# Patient Record
Sex: FEMALE | Race: WHITE | NOT HISPANIC OR LATINO | Employment: OTHER | URBAN - METROPOLITAN AREA
[De-identification: names, ages, dates, MRNs, and addresses within clinical notes are randomized per-mention and may not be internally consistent; named-entity substitution may affect disease eponyms.]

---

## 2017-01-25 ENCOUNTER — OFFICE VISIT (OUTPATIENT)
Dept: ORTHOPEDICS | Facility: CLINIC | Age: 67
End: 2017-01-25
Payer: MEDICARE

## 2017-01-25 VITALS
BODY MASS INDEX: 26.96 KG/M2 | WEIGHT: 157.94 LBS | HEART RATE: 82 BPM | SYSTOLIC BLOOD PRESSURE: 110 MMHG | HEIGHT: 64 IN | DIASTOLIC BLOOD PRESSURE: 73 MMHG

## 2017-01-25 DIAGNOSIS — M17.11 PRIMARY OSTEOARTHRITIS OF RIGHT KNEE: ICD-10-CM

## 2017-01-25 DIAGNOSIS — Z96.642 STATUS POST TOTAL HIP REPLACEMENT, LEFT: Primary | ICD-10-CM

## 2017-01-25 PROCEDURE — 99213 OFFICE O/P EST LOW 20 MIN: CPT | Mod: PBBFAC | Performed by: ORTHOPAEDIC SURGERY

## 2017-01-25 PROCEDURE — 99999 PR PBB SHADOW E&M-EST. PATIENT-LVL III: CPT | Mod: PBBFAC,,, | Performed by: ORTHOPAEDIC SURGERY

## 2017-01-25 PROCEDURE — 99212 OFFICE O/P EST SF 10 MIN: CPT | Mod: S$PBB,,, | Performed by: ORTHOPAEDIC SURGERY

## 2017-01-25 RX ORDER — NITROFURANTOIN 25; 75 MG/1; MG/1
CAPSULE ORAL
Refills: 0 | COMMUNITY
Start: 2016-12-09 | End: 2017-03-10 | Stop reason: ALTCHOICE

## 2017-01-25 NOTE — MR AVS SNAPSHOT
[facility] - Orthopedics  [address]  [facility] [address]  Phone: [phone]                  Pamella Yuan   2017 11:30 AM   Office Visit    Description:  Female : 1950   Provider:  Hubert Swain MD   Department:  [facility]           Reason for Visit     Follow-up           Diagnoses this Visit        Comments    Status post total hip replacement, left    -  Primary     Primary osteoarthritis of right knee                To Do List           Goals (5 Years of Data)     None      Follow-Up and Disposition     Return in about 1 year (around 2018).    Follow-up and Disposition History      [facility]     [facility] -  Assistance  Registered nurses in the [facility] provide clinical advisement, health education, appointment booking, and other advisory services.  Call for this free service at [phone].             Medications           Message regarding Medications     Verify the changes and/or additions to your medication regime listed below are the same as discussed with your clinician today.  If any of these changes or additions are incorrect, please notify your healthcare provider.             Verify that the below list of medications is an accurate representation of the medications you are currently taking.  If none reported, the list may be blank. If incorrect, please contact your healthcare provider. Carry this list with you in case of emergency.           Current Medications     b complex vitamins tablet Take 1 tablet by mouth once daily.    krill-om3-dha-epa-om6-lip-astx (KRILL OIL, OMEGA 3 & 6,) 1,500-165-67.5 mg Cap Take 1 capsule by mouth once daily.    meloxicam (MOBIC) 15 MG tablet Take 1 tablet (15 mg total) by mouth once daily.    UNABLE TO FIND 1 tablet every morning. Estroven and energy    VIT C/E/ZN/COPPR/LUTEIN/ZEAXAN (PRESERVISION AREDS 2 ORAL) Take 2 tablets by mouth every morning.     "nitrofurantoin, macrocrystal-monohydrate, (MACROBID) 100 MG capsule TAKE ONE CAPSULE BY MOUTH TWICE DAILY FOR 5 DAYS           Clinical Reference Information           Vital Signs - Last Recorded  Most recent update: 1/25/2017 11:08 AM by Mago Almaraz MA    BP Pulse Ht Wt BMI    110/73 (BP Location: Right arm, Patient Position: Sitting, BP Method: Automatic) 82 5' 4" (1.626 m) 71.6 kg (157 lb 15.4 oz) 27.11 kg/m2      Blood Pressure          Most Recent Value    BP  110/73      Allergies as of 1/25/2017     Decongestant (Ppa)    Gluten Protein      Immunizations Administered on Date of Encounter - 1/25/2017     None      "

## 2017-01-25 NOTE — PROGRESS NOTES
Pamella Yuna is in for one year follow up for a left HARRIET.  She is doing well.  No pain in the hip.  She has resumed activities of daily living.  Right knee injection last visit provided excellent relief.    Exam demonstrates  A well developed female in no distress.  Alert and oriented.  Mood and affect are appropriate.    Hip incision is well healed.  There is a good, stable range of motion and leg lengths are clinically equal.  The extremity is neurovascularly intact.    Xrays demonstrate a well fixed and positioned prosthesis.    Imp: Doing well      F/u in one year with left hip and bilateral knee xrays

## 2017-03-10 ENCOUNTER — OFFICE VISIT (OUTPATIENT)
Dept: INTERNAL MEDICINE | Facility: CLINIC | Age: 67
End: 2017-03-10
Payer: MEDICARE

## 2017-03-10 ENCOUNTER — LAB VISIT (OUTPATIENT)
Dept: LAB | Facility: HOSPITAL | Age: 67
End: 2017-03-10
Attending: INTERNAL MEDICINE
Payer: MEDICARE

## 2017-03-10 VITALS
DIASTOLIC BLOOD PRESSURE: 88 MMHG | WEIGHT: 166 LBS | HEART RATE: 68 BPM | SYSTOLIC BLOOD PRESSURE: 124 MMHG | HEIGHT: 64 IN | BODY MASS INDEX: 28.34 KG/M2

## 2017-03-10 DIAGNOSIS — R35.0 FREQUENCY OF URINATION: ICD-10-CM

## 2017-03-10 DIAGNOSIS — R53.83 FATIGUE, UNSPECIFIED TYPE: ICD-10-CM

## 2017-03-10 DIAGNOSIS — E78.5 HYPERLIPIDEMIA, UNSPECIFIED HYPERLIPIDEMIA TYPE: ICD-10-CM

## 2017-03-10 DIAGNOSIS — D64.9 ANEMIA, UNSPECIFIED TYPE: ICD-10-CM

## 2017-03-10 DIAGNOSIS — Z00.00 HEALTH CARE MAINTENANCE: ICD-10-CM

## 2017-03-10 DIAGNOSIS — R92.8 ABNORMAL MAMMOGRAM: Primary | ICD-10-CM

## 2017-03-10 DIAGNOSIS — R10.819 ABDOMINAL TENDERNESS IN LEFT FLANK: ICD-10-CM

## 2017-03-10 LAB
ALBUMIN SERPL BCP-MCNC: 3.6 G/DL
ALP SERPL-CCNC: 188 U/L
ALT SERPL W/O P-5'-P-CCNC: 27 U/L
ANION GAP SERPL CALC-SCNC: 8 MMOL/L
AST SERPL-CCNC: 27 U/L
BASOPHILS # BLD AUTO: 0.04 K/UL
BASOPHILS NFR BLD: 0.6 %
BILIRUB SERPL-MCNC: 0.5 MG/DL
BUN SERPL-MCNC: 11 MG/DL
CALCIUM SERPL-MCNC: 9.5 MG/DL
CHLORIDE SERPL-SCNC: 103 MMOL/L
CHOLEST/HDLC SERPL: 3.8 {RATIO}
CO2 SERPL-SCNC: 29 MMOL/L
CREAT SERPL-MCNC: 0.7 MG/DL
DIFFERENTIAL METHOD: NORMAL
EOSINOPHIL # BLD AUTO: 0.2 K/UL
EOSINOPHIL NFR BLD: 2.1 %
ERYTHROCYTE [DISTWIDTH] IN BLOOD BY AUTOMATED COUNT: 14.2 %
EST. GFR  (AFRICAN AMERICAN): >60 ML/MIN/1.73 M^2
EST. GFR  (NON AFRICAN AMERICAN): >60 ML/MIN/1.73 M^2
GLUCOSE SERPL-MCNC: 84 MG/DL
HCT VFR BLD AUTO: 43.3 %
HDL/CHOLESTEROL RATIO: 26.6 %
HDLC SERPL-MCNC: 271 MG/DL
HDLC SERPL-MCNC: 72 MG/DL
HGB BLD-MCNC: 14.3 G/DL
LDLC SERPL CALC-MCNC: 183.4 MG/DL
LYMPHOCYTES # BLD AUTO: 2.4 K/UL
LYMPHOCYTES NFR BLD: 34.5 %
MCH RBC QN AUTO: 29.7 PG
MCHC RBC AUTO-ENTMCNC: 33 %
MCV RBC AUTO: 90 FL
MONOCYTES # BLD AUTO: 0.5 K/UL
MONOCYTES NFR BLD: 7.4 %
NEUTROPHILS # BLD AUTO: 3.9 K/UL
NEUTROPHILS NFR BLD: 55.1 %
NONHDLC SERPL-MCNC: 199 MG/DL
PLATELET # BLD AUTO: 209 K/UL
PMV BLD AUTO: 10.9 FL
POTASSIUM SERPL-SCNC: 3.8 MMOL/L
PROT SERPL-MCNC: 7.6 G/DL
RBC # BLD AUTO: 4.81 M/UL
SODIUM SERPL-SCNC: 140 MMOL/L
TRIGL SERPL-MCNC: 78 MG/DL
TSH SERPL DL<=0.005 MIU/L-ACNC: 2.36 UIU/ML
WBC # BLD AUTO: 7.05 K/UL

## 2017-03-10 PROCEDURE — 36415 COLL VENOUS BLD VENIPUNCTURE: CPT | Mod: PO

## 2017-03-10 PROCEDURE — 85025 COMPLETE CBC W/AUTO DIFF WBC: CPT

## 2017-03-10 PROCEDURE — 84443 ASSAY THYROID STIM HORMONE: CPT

## 2017-03-10 PROCEDURE — 80053 COMPREHEN METABOLIC PANEL: CPT

## 2017-03-10 PROCEDURE — 99999 PR PBB SHADOW E&M-EST. PATIENT-LVL III: CPT | Mod: PBBFAC,,, | Performed by: INTERNAL MEDICINE

## 2017-03-10 PROCEDURE — 99214 OFFICE O/P EST MOD 30 MIN: CPT | Mod: S$PBB,,, | Performed by: INTERNAL MEDICINE

## 2017-03-10 PROCEDURE — 80061 LIPID PANEL: CPT

## 2017-03-10 NOTE — PROGRESS NOTES
HISTORY OF PRESENT ILLNESS:  PtDomenica is a 66 y.o. female presents for annual and also with possible bladder infection.  She was last seen 4/9/16.  Has been seeing Dr. Swain of [facility].  She also was supposed to have breast biopsy after abnormal mammogram, had stated she was getting a second opinion, was seen by Dr. Chaudhari in [place], is pending repeat in 4/17 for repeat diagnostic.  She had seen Dr. Abrams in 2003, was diagnosed with sprue.  She states she keeps to diet.    Lab Results   Component Value Date    WBC 8.95 02/19/2016    HGB 9.9 (L) 02/19/2016    HCT 29.7 (L) 02/19/2016     02/19/2016    CHOL 265 (H) 11/20/2015    TRIG 74 11/20/2015    HDL 80 (H) 11/20/2015    ALT 24 01/19/2016    AST 25 01/19/2016     02/19/2016    K 3.9 02/19/2016     (H) 02/19/2016    CREATININE 0.6 02/19/2016    BUN 9 02/19/2016    CO2 22 (L) 02/19/2016    TSH 3.034 11/20/2015    INR 0.9 02/02/2016     Lab Results   Component Value Date    LDLCALC 170.2 (H) 11/20/2015               ROS:  GENERAL: No fever, chills, positive fatigability; no weight loss.  SKIN: No rashes, itching or changes in color or texture of skin; positive skin lesion;  HEAD: No headaches or recent head trauma.  EARS: Denies ear pain, discharge or vertigo.  NOSE: No loss of smell, no epistaxis or postnasal drip.  MOUTH & THROAT: No hoarseness or change in voice. No excessive gum bleeding.  NODES: Denies swollen glands.  CHEST: Denies MARTINZE, cyanosis, wheezing, cough and sputum production.  CARDIOVASCULAR: Denies chest pain, PND, orthopnea or reduced exercise tolerance.  ABDOMEN: Appetite fine. No weight loss. Occ constipation, no diarrhea, abdominal pain, hematemesis or blood in stool.  URINARY: No flank pain, dysuria or hematuria; chronic intermittent frequency; has nagging to left flank;  PERIPHERAL VASCULAR: No claudication or cyanosis. No edema.  MUSCULOSKELETAL: No joint stiffness or swelling. Denies back pain.  NEUROLOGIC: Denies  numbness    PE:   Vitals:   Vitals:    03/10/17 1022   BP: 124/88   Pulse: 68     GENERAL: no acute distress, A&Ox3, comfortable.  Female with body mass index of 28   HEENT: tympanic membranes clear, nasal mucosa pink, no pharyngeal erythema or exudate  NECK: supple, no cervical lymphadenopathy, no thyromegaly; no supraclavicular nodes;   CHEST:  clear to auscultation bilaterally, no crackles or wheeze; no increased work of breathing;  CARDIOVASCULAR: regular rate and rhythm, no rubs, murmurs or gallops.  ABDOMEN: normal bowel sounds, soft non-tender, non-distended; no palpable organomegaly;   EXT: no clubbing, cyanosis or edema.     ASSESSMENT/PLAN:    Abnormal mammogram: being followed in [place];    Anemia, unspecified type  -     CBC auto differential; Future; Expected date: 3/10/17    Hyperlipidemia, unspecified hyperlipidemia type  -     Comprehensive metabolic panel; Future; Expected date: 3/10/17  -     Lipid panel; Future; Expected date: 3/10/17    Frequency of urination  -     URINALYSIS; Future; Expected date: 3/10/17  -     CULTURE, URINE; Future; Expected date: 3/10/17    Fatigue, unspecified type  -     CBC auto differential; Future; Expected date: 3/10/17  -     TSH; Future; Expected date: 3/10/17    Abdominal tenderness in left flank  -     US Abdomen Complete; Future; Expected date: 3/10/17    Health care maintenance  -     CBC auto differential; Future; Expected date: 3/10/17  -     Comprehensive metabolic panel; Future; Expected date: 3/10/17  -     Lipid panel; Future; Expected date: 3/10/17  -     Pneumococcal Conjugate Vaccine (13 Valent) (IM)        Call if condition changes or worsens.

## 2017-03-10 NOTE — MR AVS SNAPSHOT
[facility]  [address]  [facility] [address]  Phone: [phone]  Fax: [phone]                  Pamella Yuan   3/10/2017 10:20 AM   Office Visit    Description:  Female : 1950   Provider:  Pooja Vazquez MD   Department:  [facility]           Reason for Visit     possible bladder infection           Diagnoses this Visit        Comments    Health care maintenance    -  Primary     Anemia, unspecified type         Hyperlipidemia, unspecified hyperlipidemia type         Frequency of urination         Fatigue, unspecified type         Abdominal tenderness in left flank                To Do List           Future Appointments        Provider Department Dept Phone    3/10/2017 11:05 AM LAB, [place] [facility] [phone]    3/10/2017 11:20 AM LAB, [place] [facility] [phone]    3/16/2017 8:45 AM [facility] [phone]    2017 10:00 AM Lisa Elder MD [facility] [phone]      Goals (5 Years of Data)     None      [facility]     [facility] Care [facility] -  Assistance  Registered nurses in the [facility] provide clinical advisement, health education, appointment booking, and other advisory services.  Call for this free service at [phone].             Medications           Message regarding Medications     Verify the changes and/or additions to your medication regime listed below are the same as discussed with your clinician today.  If any of these changes or additions are incorrect, please notify your healthcare provider.        STOP taking these medications     nitrofurantoin, macrocrystal-monohydrate, (MACROBID) 100 MG capsule TAKE ONE CAPSULE BY MOUTH TWICE DAILY FOR 5 DAYS           Verify that the below list of medications is an accurate representation of the medications you are currently taking.  If none  "reported, the list may be blank. If incorrect, please contact your healthcare provider. Carry this list with you in case of emergency.           Current Medications     b complex vitamins tablet Take 1 tablet by mouth once daily.    krill-om3-dha-epa-om6-lip-astx (KRILL OIL, OMEGA 3 & 6,) 1,500-165-67.5 mg Cap Take 1 capsule by mouth once daily.    meloxicam (MOBIC) 15 MG tablet Take 1 tablet (15 mg total) by mouth once daily.    UNABLE TO FIND 1 tablet every morning. Estroven and energy    VIT C/E/ZN/COPPR/LUTEIN/ZEAXAN (PRESERVISION AREDS 2 ORAL) Take 2 tablets by mouth every morning.           Clinical Reference Information           Your Vitals Were     BP Pulse Height Weight BMI    124/88 68 5' 4" (1.626 m) 75.3 kg (166 lb 0.1 oz) 28.49 kg/m2      Blood Pressure          Most Recent Value    BP  124/88      Allergies as of 3/10/2017     Decongestant (Ppa)    Gluten Protein      Immunizations Administered on Date of Encounter - 3/10/2017     Name Date Dose VIS Date Route    Pneumococcal Conjugate - 13 Valent 3/10/2017 0.5 mL 11/5/2015 Intramuscular      Orders Placed During Today's Visit      Normal Orders This Visit    Pneumococcal Conjugate Vaccine (13 Valent) (IM)     Future Labs/Procedures Expected by Expires    CBC auto differential  3/10/2017 6/8/2017    Comprehensive metabolic panel  3/10/2017 6/8/2017    CULTURE, URINE  3/10/2017 5/9/2018    Lipid panel  3/10/2017 3/11/2018    TSH  3/10/2017 3/11/2018    URINALYSIS  3/10/2017 5/9/2018    US Abdomen Complete  3/10/2017 3/10/2018      Language Assistance Services     ATTENTION: Language assistance services are available, free of charge. Please call [phone].      ATENCIÓN: Si heatherla leo, tiene a briones disposición servicios gratuitos de asistencia lingüística. Llame al [phone].     CHÚ Ý: N?u b?n nói Ti?ng Vi?t, có các d?ch v? h? tr? ngôn ng? mi?n phí dành cho b?n. G?i s? [phone].         [facility] complies with " applicable Federal civil rights laws and does not discriminate on the basis of race, color, national origin, age, disability, or sex.

## 2017-03-13 ENCOUNTER — TELEPHONE (OUTPATIENT)
Dept: INTERNAL MEDICINE | Facility: CLINIC | Age: 67
End: 2017-03-13

## 2017-03-13 DIAGNOSIS — E78.5 HYPERLIPIDEMIA, UNSPECIFIED HYPERLIPIDEMIA TYPE: Primary | ICD-10-CM

## 2017-03-13 RX ORDER — PRAVASTATIN SODIUM 20 MG/1
20 TABLET ORAL DAILY
Qty: 30 TABLET | Refills: 1 | Status: SHIPPED | OUTPATIENT
Start: 2017-03-13 | End: 2017-05-14 | Stop reason: SDUPTHER

## 2017-03-16 ENCOUNTER — HOSPITAL ENCOUNTER (OUTPATIENT)
Dept: RADIOLOGY | Facility: HOSPITAL | Age: 67
Discharge: HOME | End: 2017-03-16
Attending: INTERNAL MEDICINE
Payer: MEDICARE

## 2017-03-16 DIAGNOSIS — R10.819 ABDOMINAL TENDERNESS IN LEFT FLANK: ICD-10-CM

## 2017-03-16 PROCEDURE — 76700 US EXAM ABDOM COMPLETE: CPT | Mod: TC,PO

## 2017-03-16 PROCEDURE — 76700 US EXAM ABDOM COMPLETE: CPT | Mod: 26,,, | Performed by: RADIOLOGY

## 2017-03-17 ENCOUNTER — TELEPHONE (OUTPATIENT)
Dept: FAMILY MEDICINE | Facility: CLINIC | Age: 67
End: 2017-03-17

## 2017-03-17 DIAGNOSIS — R30.0 DYSURIA: Primary | ICD-10-CM

## 2017-03-17 NOTE — TELEPHONE ENCOUNTER
----- Message from Richy Bagley sent at 3/17/2017  9:08 AM CDT -----  Contact: Linda Blackwell left a message yesterday to call her back this morning. Please call back [phone] or [phone]. Thank you!

## 2017-03-17 NOTE — TELEPHONE ENCOUNTER
Dr Vazquez   Pt states she is still hurting when urinating and nothing is showing up on tests. Also she wants her u/s reviewed. I gave her lab results.

## 2017-04-12 ENCOUNTER — INITIAL CONSULT (OUTPATIENT)
Dept: UROLOGY | Facility: CLINIC | Age: 67
End: 2017-04-12
Payer: MEDICARE

## 2017-04-12 VITALS
DIASTOLIC BLOOD PRESSURE: 82 MMHG | BODY MASS INDEX: 28.68 KG/M2 | SYSTOLIC BLOOD PRESSURE: 134 MMHG | HEIGHT: 64 IN | HEART RATE: 76 BPM | WEIGHT: 168 LBS

## 2017-04-12 DIAGNOSIS — N30.00 ACUTE CYSTITIS WITHOUT HEMATURIA: Primary | ICD-10-CM

## 2017-04-12 DIAGNOSIS — N95.2 ATROPHIC VAGINITIS: ICD-10-CM

## 2017-04-12 LAB
BILIRUB SERPL-MCNC: NORMAL MG/DL
BLOOD URINE, POC: NORMAL
COLOR, POC UA: YELLOW
GLUCOSE UR QL STRIP: NORMAL
KETONES UR QL STRIP: NORMAL
LEUKOCYTE ESTERASE URINE, POC: NORMAL
NITRITE, POC UA: NORMAL
PH, POC UA: 6
PROTEIN, POC: NORMAL
SPECIFIC GRAVITY, POC UA: 1.01
UROBILINOGEN, POC UA: NORMAL

## 2017-04-12 PROCEDURE — 81002 URINALYSIS NONAUTO W/O SCOPE: CPT | Mod: PBBFAC,PO | Performed by: UROLOGY

## 2017-04-12 PROCEDURE — 99213 OFFICE O/P EST LOW 20 MIN: CPT | Mod: PBBFAC,PO | Performed by: UROLOGY

## 2017-04-12 PROCEDURE — 99999 PR PBB SHADOW E&M-EST. PATIENT-LVL III: CPT | Mod: PBBFAC,,, | Performed by: UROLOGY

## 2017-04-12 PROCEDURE — 87088 URINE BACTERIA CULTURE: CPT

## 2017-04-12 PROCEDURE — 99204 OFFICE O/P NEW MOD 45 MIN: CPT | Mod: S$PBB,,, | Performed by: UROLOGY

## 2017-04-12 PROCEDURE — 87077 CULTURE AEROBIC IDENTIFY: CPT

## 2017-04-12 PROCEDURE — 87186 SC STD MICRODIL/AGAR DIL: CPT

## 2017-04-12 PROCEDURE — 87086 URINE CULTURE/COLONY COUNT: CPT

## 2017-04-12 NOTE — LETTER
April 13, 2017      Pooja Vazquez MD  [address] [place] [address]           [facility] - Urology  [address] [place] [address]  Phone: [phone]          Patient: Pamella Yuan   MR Number: [id number]   YOB: 1950   Date of Visit: 4/12/2017       Dear Dr. Pooja Vazquez:    Thank you for referring Pamella Yuan to me for evaluation. Attached you will find relevant portions of my assessment and plan of care.    If you have questions, please do not hesitate to call me. I look forward to following Pamella Yuan along with you.    Sincerely,    Sean Mancini MD    Enclosure  CC:  No Recipients    If you would like to receive this communication electronically, please contact [email or web address] or [phone] to request more information on [employer] Link access.    For providers and/or their staff who would like to refer a patient to [facility], please contact us through our one-stop-shop provider referral line, [facility] [place], at [phone].    If you feel you have received this communication in error or would no longer like to receive these types of communications, please e-mail [email or web address]

## 2017-04-14 NOTE — PROGRESS NOTES
URINALYSIS:  Color yellow, SG 13, pH 6, leukocytes 1+.  All others negative.    CHIEF COMPLAINT:  Dysuria and urgency.    A 66-year-old female who had a severe UTI one year ago by E. coli and has since   then had other similar episodes symptomatically equivalent to that severe UTI   she had previously, but with negative cultures.  Her symptoms are urgency,   urinary frequency and burning during voiding.  She does not reach the point of   incontinence and does not need to use pads.  She has no nocturia.  She does have   a chronic tendency to urinary frequency, which is lifelong, only that it   becomes worse during the times of her UTIs or bladder irritation.    PAST MEDICAL HISTORY:  SURGICAL:  Oophorectomy, hysterectomy, hip surgery, cholecystectomy and cervical   fusion.  MEDICAL:  Celiac disease, osteoarthritis, dementia.  FAMILIAL:  Father had heart disease and nephrolithiasis.  Mother had   hypertension and heart disease.  SOCIAL:  The patient is a retired , , lives in [place], [place].    ALLERGIES:  Gluten protein, no medications.    PHYSICAL EXAMINATION:  ABDOMEN:  Flat.  No masses.  CVA is negative.  No organomegaly or tenderness.    IMPRESSION:  Recurrent UTIs, sensation of chronic bladder irritation, overactive   bladder.    PLAN:  I recommend urinary workup with cystoscopy, bladder scan, upper tract   imaging, and urine culture.  We have to determine if she is having true UTIs or   female hormone deficiency, in which case she might benefit from finger   applicated amounts of Estrace vaginal cream.  She may benefit from treatment of   her chronic frequency with a bladder relaxant.      ERR/PN  dd: 04/13/2017 19:51:39 (CDT)  td: 04/14/2017 09:51:25 (CDT)  Doc ID   #[id number]  Job ID #[id number]    CC:

## 2017-04-15 LAB — BACTERIA UR CULT: NORMAL

## 2017-04-17 ENCOUNTER — TELEPHONE (OUTPATIENT)
Dept: UROLOGY | Facility: CLINIC | Age: 67
End: 2017-04-17

## 2017-04-17 DIAGNOSIS — N30.00 ACUTE CYSTITIS WITHOUT HEMATURIA: Primary | ICD-10-CM

## 2017-04-17 RX ORDER — CIPROFLOXACIN 500 MG/1
500 TABLET ORAL 2 TIMES DAILY
Qty: 20 TABLET | Refills: 0 | Status: SHIPPED | OUTPATIENT
Start: 2017-04-17 | End: 2017-04-27

## 2017-04-24 ENCOUNTER — TELEPHONE (OUTPATIENT)
Dept: FAMILY MEDICINE | Facility: CLINIC | Age: 67
End: 2017-04-24

## 2017-04-24 NOTE — TELEPHONE ENCOUNTER
Pt placed on abt buy Dr Mancini. She has developed rust stain rash on palms. No itching or sob noted. Has appt tomorrow with Dr Mancini tomorrow. Pt would like a call back please.

## 2017-04-24 NOTE — TELEPHONE ENCOUNTER
----- Message from Cristin Helton sent at 4/24/2017 11:37 AM CDT -----  Contact: patient  Patient calling in regards to her waking up today with rust spots on both hands. She wants to speak with a Nurse. She doesn't know if it is a drug reaction. Please advise.  Call back  or .  Thanks!

## 2017-04-24 NOTE — TELEPHONE ENCOUNTER
Unlikely a drug reaction to Cipro, patient will continue to take.  Has appointment tomorrow for Cystoscopy and we will check her urine prior to scope to make sure she is clear of infection and Dr. Mancini can check her hands tomorrow.

## 2017-04-25 ENCOUNTER — PROCEDURE VISIT (OUTPATIENT)
Dept: UROLOGY | Facility: CLINIC | Age: 67
End: 2017-04-25
Payer: MEDICARE

## 2017-04-25 VITALS
HEART RATE: 80 BPM | HEIGHT: 64 IN | DIASTOLIC BLOOD PRESSURE: 72 MMHG | BODY MASS INDEX: 28.24 KG/M2 | WEIGHT: 165.38 LBS | SYSTOLIC BLOOD PRESSURE: 124 MMHG

## 2017-04-25 DIAGNOSIS — R33.9 INCOMPLETE BLADDER EMPTYING: ICD-10-CM

## 2017-04-25 DIAGNOSIS — N39.0 RECURRENT UTI: ICD-10-CM

## 2017-04-25 DIAGNOSIS — N30.00 ACUTE CYSTITIS WITHOUT HEMATURIA: ICD-10-CM

## 2017-04-25 DIAGNOSIS — N95.2 ATROPHIC VAGINITIS: Primary | ICD-10-CM

## 2017-04-25 LAB
BILIRUB SERPL-MCNC: NORMAL MG/DL
BLOOD URINE, POC: NORMAL
COLOR, POC UA: YELLOW
GLUCOSE UR QL STRIP: NORMAL
KETONES UR QL STRIP: NORMAL
LEUKOCYTE ESTERASE URINE, POC: NORMAL
NITRITE, POC UA: NORMAL
PH, POC UA: 5
PROTEIN, POC: NORMAL
SPECIFIC GRAVITY, POC UA: 1
UROBILINOGEN, POC UA: NORMAL

## 2017-04-25 PROCEDURE — 81002 URINALYSIS NONAUTO W/O SCOPE: CPT | Mod: PBBFAC,PO | Performed by: UROLOGY

## 2017-04-25 PROCEDURE — 52000 CYSTOURETHROSCOPY: CPT | Mod: PBBFAC,PO | Performed by: UROLOGY

## 2017-04-25 PROCEDURE — 51798 US URINE CAPACITY MEASURE: CPT | Mod: PBBFAC,PO | Performed by: UROLOGY

## 2017-04-25 PROCEDURE — 52000 CYSTOURETHROSCOPY: CPT | Mod: S$PBB,,, | Performed by: UROLOGY

## 2017-04-25 RX ORDER — ESTRADIOL 0.1 MG/G
1 CREAM VAGINAL DAILY
Qty: 42 G | Refills: 5 | Status: SHIPPED | OUTPATIENT
Start: 2017-04-25 | End: 2017-04-26

## 2017-04-25 NOTE — MR AVS SNAPSHOT
[facility]  [address]  [facility] [address]  Phone: [phone]                  Pamella Yuan   2017 9:00 AM   Procedure visit    Description:  Female : 1950   Provider:  Sean Mancini MD   Department:  [facility]           Reason for Visit     Follow-up           Diagnoses this Visit        Comments    Acute cystitis without hematuria                To Do List           Future Appointments        Provider Department Dept Phone    2017 9:00 AM Sean Mancini MD [facility] [phone]    2017 8:30 AM Olga Sifuentes PA-C [facility] - Orthopedics [phone]    2017 10:00 AM Lisa Elder MD [facility] D[facility]atology [phone]      Goals (5 Years of Data)     None      [facility]     [facility] Nurse Care [facility] -  Assistance  Unless otherwise directed by your provider, please contact [facility], our nurse care line that is available for  assistance.     Registered nurses in the [facility] provide: appointment scheduling, clinical advisement, health education, and other advisory services.  Call: [phone] (toll free)               Medications           Message regarding Medications     Verify the changes and/or additions to your medication regime listed below are the same as discussed with your clinician today.  If any of these changes or additions are incorrect, please notify your healthcare provider.        STOP taking these medications     UNABLE TO FIND 1 tablet every morning. Estroven and energy           Verify that the below list of medications is an accurate representation of the medications you are currently taking.  If none reported, the list may be blank. If incorrect, please contact your healthcare provider. Carry this list with you in case of emergency.           Current Medications     b complex vitamins tablet Take 1 tablet by mouth once daily.    ciprofloxacin HCl (CIPRO)  "500 MG tablet Take 1 tablet (500 mg total) by mouth 2 (two) times daily.    krill-om3-dha-epa-om6-lip-astx (KRILL OIL, OMEGA 3 & 6,) 1,500-165-67.5 mg Cap Take 1 capsule by mouth once daily.    meloxicam (MOBIC) 15 MG tablet Take 1 tablet (15 mg total) by mouth once daily.    pravastatin (PRAVACHOL) 20 MG tablet Take 1 tablet (20 mg total) by mouth once daily.    VIT C/E/ZN/COPPR/LUTEIN/ZEAXAN (PRESERVISION AREDS 2 ORAL) Take 2 tablets by mouth every morning.           Clinical Reference Information           Your Vitals Were     BP Pulse Height Weight BMI    124/72 (BP Location: Right arm) 80 5' 4" (1.626 m) 75 kg (165 lb 5.5 oz) 28.38 kg/m2      Blood Pressure          Most Recent Value    BP  124/72      Allergies as of 4/25/2017     Decongestant (Ppa)    Gluten Protein      Immunizations Administered on Date of Encounter - 4/25/2017     None      Orders Placed During Today's Visit      Normal Orders This Visit    Cystoscopy       Language Assistance Services     ATTENTION: Language assistance services are available, free of charge. Please call [phone].      ATENCIÓN: Si habla leo, tiene a broines disposición servicios gratuitos de asistencia lingüística. Llame al [phone].     LIZABETH Ý: N?u b?n nói Ti?ng Vi?t, có các d?ch v? h? tr? ngôn ng? mi?n phí dành cho b?n. G?i s? [phone].         [facility] complies with applicable Federal civil rights laws and does not discriminate on the basis of race, color, national origin, age, disability, or sex.        "

## 2017-04-25 NOTE — PROGRESS NOTES
UROLOGY [place]  4 25 17    c-c recurrent uti    CHIEF COMPLAINT: Dysuria and urgency.  Had one proven uti and then several episodes of urethral irritation with negative cultures     abd soft and nontender  CVAs neg  No organomegaly  External genitalia with some atrophic changes. There is some pallor of the muscosa and thinning of it.  Vagina narrow and slightly tender  CYSTOSCOPY olympus flexible. Urethra with no stricture or diverticulum. Bladder cavity distends symmetrically and equally when distended with water. Mucosal layer with no lesions. No abnormal trabeculation. Location and shape of the ureteral orifices normal. Pt tolerated the procedure well.       BLADDERSCAN ULTRASOUND  0   ml residual    RENAL ULTRASOUND 3 16 17  The right kidney measures 10.3 cm and the left 10.2 cm in longitudinal dimensions. The visualized portions of the upper abdominal aorta and IVC are unremarkable. The visualized pancreas is unremarkable. The gallbladder surgically absent. The common bile duct measures 5 mm. There is mild fatty liver filtration. No focal hepatic masses are seen. There is no evidence of hydronephrosis. The spleen is unremarkable.      Prior cholecystectomy  Mild fatty liver infiltration  Otherwise unremarkable complete abdominal ultrasound       IMPRESSION:  Recent uti, resolved  Atrophic vaginitis  I recommend the use of estrace vaginal cream to be applied with fingertip on vaginal introitus and urethra three nights a week.    Can bring us a urine specimen for culture if at any point she feels she is infected again.  No antibiotic prophylaxis being ordered today  RTC yearly or as needed    I discussed the possible use of a bladder relaxant but pt says her OAB symptoms are very mild

## 2017-04-26 DIAGNOSIS — N95.2 ATROPHIC VAGINITIS: Primary | ICD-10-CM

## 2017-04-26 DIAGNOSIS — R33.9 INCOMPLETE BLADDER EMPTYING: ICD-10-CM

## 2017-04-26 RX ORDER — ESTRADIOL 0.1 MG/G
1 CREAM VAGINAL
Qty: 30 G | Refills: 2 | Status: SHIPPED | OUTPATIENT
Start: 2017-04-27 | End: 2022-04-18

## 2017-04-26 NOTE — TELEPHONE ENCOUNTER
Spoke to pt and [facility] Please send prescription. Pt's insurance denied it from [employer].

## 2017-05-09 ENCOUNTER — OFFICE VISIT (OUTPATIENT)
Dept: ORTHOPEDICS | Facility: CLINIC | Age: 67
End: 2017-05-09
Payer: MEDICARE

## 2017-05-09 VITALS — HEIGHT: 64 IN | WEIGHT: 167.56 LBS | BODY MASS INDEX: 28.6 KG/M2

## 2017-05-09 DIAGNOSIS — M17.11 PRIMARY OSTEOARTHRITIS OF RIGHT KNEE: Primary | ICD-10-CM

## 2017-05-09 PROCEDURE — 99213 OFFICE O/P EST LOW 20 MIN: CPT | Mod: PBBFAC,25 | Performed by: PHYSICIAN ASSISTANT

## 2017-05-09 PROCEDURE — 99999 PR PBB SHADOW E&M-EST. PATIENT-LVL III: CPT | Mod: PBBFAC,,, | Performed by: PHYSICIAN ASSISTANT

## 2017-05-09 PROCEDURE — 99212 OFFICE O/P EST SF 10 MIN: CPT | Mod: 25,S$PBB,, | Performed by: PHYSICIAN ASSISTANT

## 2017-05-09 PROCEDURE — 20610 DRAIN/INJ JOINT/BURSA W/O US: CPT | Mod: S$PBB,RT,, | Performed by: PHYSICIAN ASSISTANT

## 2017-05-09 PROCEDURE — 20610 DRAIN/INJ JOINT/BURSA W/O US: CPT | Mod: PBBFAC | Performed by: PHYSICIAN ASSISTANT

## 2017-05-09 RX ORDER — MELOXICAM 15 MG/1
15 TABLET ORAL DAILY
Qty: 30 TABLET | Refills: 3 | Status: SHIPPED | OUTPATIENT
Start: 2017-05-09 | End: 2018-01-29 | Stop reason: SDUPTHER

## 2017-05-09 RX ORDER — TRIAMCINOLONE ACETONIDE 40 MG/ML
60 INJECTION, SUSPENSION INTRA-ARTICULAR; INTRAMUSCULAR
Status: COMPLETED | OUTPATIENT
Start: 2017-05-09 | End: 2017-05-09

## 2017-05-09 RX ADMIN — TRIAMCINOLONE ACETONIDE 60 MG: 40 INJECTION, SUSPENSION INTRA-ARTICULAR; INTRAMUSCULAR at 09:05

## 2017-05-14 ENCOUNTER — TELEPHONE (OUTPATIENT)
Dept: INTERNAL MEDICINE | Facility: CLINIC | Age: 67
End: 2017-05-14

## 2017-05-14 DIAGNOSIS — E78.5 HYPERLIPIDEMIA, UNSPECIFIED HYPERLIPIDEMIA TYPE: ICD-10-CM

## 2017-05-17 RX ORDER — PRAVASTATIN SODIUM 20 MG/1
TABLET ORAL
Qty: 30 TABLET | Refills: 0 | Status: SHIPPED | OUTPATIENT
Start: 2017-05-17 | End: 2017-06-15 | Stop reason: SDUPTHER

## 2017-05-19 ENCOUNTER — LAB VISIT (OUTPATIENT)
Dept: LAB | Facility: HOSPITAL | Age: 67
End: 2017-05-19
Attending: INTERNAL MEDICINE
Payer: MEDICARE

## 2017-05-19 DIAGNOSIS — E78.5 HYPERLIPIDEMIA, UNSPECIFIED HYPERLIPIDEMIA TYPE: ICD-10-CM

## 2017-05-19 LAB
ALT SERPL W/O P-5'-P-CCNC: 33 U/L
AST SERPL-CCNC: 23 U/L
CHOLEST/HDLC SERPL: 2.8 {RATIO}
HDL/CHOLESTEROL RATIO: 35.1 %
HDLC SERPL-MCNC: 185 MG/DL
HDLC SERPL-MCNC: 65 MG/DL
LDLC SERPL CALC-MCNC: 107.2 MG/DL
NONHDLC SERPL-MCNC: 120 MG/DL
TRIGL SERPL-MCNC: 64 MG/DL

## 2017-05-19 PROCEDURE — 84460 ALANINE AMINO (ALT) (SGPT): CPT

## 2017-05-19 PROCEDURE — 36415 COLL VENOUS BLD VENIPUNCTURE: CPT | Mod: PO

## 2017-05-19 PROCEDURE — 80061 LIPID PANEL: CPT

## 2017-05-19 PROCEDURE — 84450 TRANSFERASE (AST) (SGOT): CPT

## 2017-06-15 DIAGNOSIS — E78.5 HYPERLIPIDEMIA, UNSPECIFIED HYPERLIPIDEMIA TYPE: ICD-10-CM

## 2017-06-15 RX ORDER — PRAVASTATIN SODIUM 20 MG/1
TABLET ORAL
Qty: 30 TABLET | Refills: 11 | Status: SHIPPED | OUTPATIENT
Start: 2017-06-15 | End: 2018-06-27 | Stop reason: SDUPTHER

## 2017-08-02 ENCOUNTER — OFFICE VISIT (OUTPATIENT)
Dept: DERMATOLOGY | Facility: CLINIC | Age: 67
End: 2017-08-02
Payer: MEDICARE

## 2017-08-02 VITALS — WEIGHT: 167 LBS | BODY MASS INDEX: 28.51 KG/M2 | HEIGHT: 64 IN

## 2017-08-02 DIAGNOSIS — Z12.83 SKIN CANCER SCREENING: Primary | ICD-10-CM

## 2017-08-02 DIAGNOSIS — Z80.8 FAMILY HISTORY OF SKIN CANCER: ICD-10-CM

## 2017-08-02 DIAGNOSIS — L82.1 SEBORRHEIC KERATOSES: ICD-10-CM

## 2017-08-02 DIAGNOSIS — Z87.2 HISTORY OF ACTINIC KERATOSES: ICD-10-CM

## 2017-08-02 PROCEDURE — 1126F AMNT PAIN NOTED NONE PRSNT: CPT | Mod: ,,, | Performed by: DERMATOLOGY

## 2017-08-02 PROCEDURE — 1159F MED LIST DOCD IN RCRD: CPT | Mod: ,,, | Performed by: DERMATOLOGY

## 2017-08-02 PROCEDURE — 99212 OFFICE O/P EST SF 10 MIN: CPT | Mod: PBBFAC,PO | Performed by: DERMATOLOGY

## 2017-08-02 PROCEDURE — 99214 OFFICE O/P EST MOD 30 MIN: CPT | Mod: S$PBB,,, | Performed by: DERMATOLOGY

## 2017-08-02 PROCEDURE — 99999 PR PBB SHADOW E&M-EST. PATIENT-LVL II: CPT | Mod: PBBFAC,,, | Performed by: DERMATOLOGY

## 2017-08-02 NOTE — PROGRESS NOTES
Subjective:       Patient ID:  Pamella Yuan is a 66 y.o. female who presents for No chief complaint on file.    Presents today for TBSE.  Last seen January 2016. No points of concern.    No Phx of skin cancer  Mother & Father hx BCC (multiple)      History AK s/p cryo  Most recently s/p biopsy BLK left arm     Review of Systems   Constitutional: Negative for weight loss, weight gain and fatigue.   Skin: Positive for daily sunscreen use, activity-related sunscreen use and wears hat.   Hematologic/Lymphatic: Does not bruise/bleed easily.        Objective:    Physical Exam   Skin:                      Diagram Legend     Erythematous scaling macule/papule c/w actinic keratosis       Vascular papule c/w angioma      Pigmented verrucoid papule/plaque c/w seborrheic keratosis      Yellow umbilicated papule c/w sebaceous hyperplasia      Irregularly shaped tan macule c/w lentigo     1-2 mm smooth white papules consistent with Milia      Movable subcutaneous cyst with punctum c/w epidermal inclusion cyst      Subcutaneous movable cyst c/w pilar cyst      Firm pink to brown papule c/w dermatofibroma      Pedunculated fleshy papule(s) c/w skin tag(s)      Evenly pigmented macule c/w junctional nevus     Mildly variegated pigmented, slightly irregular-bordered macule c/w mildly atypical nevus      Flesh colored to evenly pigmented papule c/w intradermal nevus       Pink pearly papule/plaque c/w basal cell carcinoma      Erythematous hyperkeratotic cursted plaque c/w SCC      Surgical scar with no sign of skin cancer recurrence      Open and closed comedones      Inflammatory papules and pustules      Verrucoid papule consistent consistent with wart     Erythematous eczematous patches and plaques     Dystrophic onycholytic nail with subungual debris c/w onychomycosis     Umbilicated papule    Erythematous-base heme-crusted tan verrucoid plaque consistent with inflamed seborrheic keratosis     Erythematous Silvery Scaling  Plaque c/w Psoriasis     See annotation      Assessment / Plan:        Skin cancer screening    Total body skin examination performed today including at least 12 points as noted in physical examination. No lesions suspicious for malignancy noted.      Family history of skin cancer      Total body skin examination performed today including at least 12 points as noted in physical examination. No lesions suspicious for malignancy noted.      Seborrheic keratoses, trunk and extremities  These are benign inherited growths without a malignant potential. Reassurance given to patient. No treatment is necessary.       History of actinic keratoses  Resolved s/p cryo             Return in about 1 year (around 8/2/2018).

## 2017-09-05 ENCOUNTER — OFFICE VISIT (OUTPATIENT)
Dept: OPTOMETRY | Facility: CLINIC | Age: 67
End: 2017-09-05
Payer: MEDICARE

## 2017-09-05 DIAGNOSIS — H52.4 ASTIGMATISM WITH PRESBYOPIA, BILATERAL: ICD-10-CM

## 2017-09-05 DIAGNOSIS — Z13.5 GLAUCOMA SCREENING: ICD-10-CM

## 2017-09-05 DIAGNOSIS — H25.13 NUCLEAR SCLEROSIS, BILATERAL: ICD-10-CM

## 2017-09-05 DIAGNOSIS — Z98.890 S/P LASIK SURGERY OF BOTH EYES: ICD-10-CM

## 2017-09-05 DIAGNOSIS — H35.89 MACULAR RPE MOTTLING: Primary | ICD-10-CM

## 2017-09-05 DIAGNOSIS — H52.203 ASTIGMATISM WITH PRESBYOPIA, BILATERAL: ICD-10-CM

## 2017-09-05 PROCEDURE — 99212 OFFICE O/P EST SF 10 MIN: CPT | Mod: PBBFAC,PO | Performed by: OPTOMETRIST

## 2017-09-05 PROCEDURE — 92134 CPTRZ OPH DX IMG PST SGM RTA: CPT | Mod: PBBFAC,PO | Performed by: OPTOMETRIST

## 2017-09-05 PROCEDURE — 99999 PR PBB SHADOW E&M-EST. PATIENT-LVL II: CPT | Mod: PBBFAC,,, | Performed by: OPTOMETRIST

## 2017-09-05 PROCEDURE — 92004 COMPRE OPH EXAM NEW PT 1/>: CPT | Mod: S$PBB,,, | Performed by: OPTOMETRIST

## 2017-09-05 PROCEDURE — 92015 DETERMINE REFRACTIVE STATE: CPT | Mod: ,,, | Performed by: OPTOMETRIST

## 2017-09-06 NOTE — PROGRESS NOTES
"HPI     Annual Exam    Additional comments: DLE ???          ocular health exam            Blurred Vision    Additional comments: at distance only ---  near [facility] good           Spots and/or Floaters    Additional comments: OU -- no light flashes           Comments   Lasik w/ Bonds 2011  Monovision w/ OS near  Last exam was post op to sx  Was dx "macular degeneration" and seen every 3 months, but stopped because   "I didn't want to pay a $50 copay to hear things were the same"         Last edited by MARIA R Burns, OD on 9/5/2017  4:05 PM. (History)        ROS     Positive for: Eyes    Negative for: Constitutional, Gastrointestinal, Neurological, Skin,   Genitourinary, Musculoskeletal, HENT, Endocrine, Cardiovascular,   Respiratory, Psychiatric, Allergic/Imm, Heme/Lymph    Last edited by MARIA R Burns, OD on 9/5/2017  4:05 PM. (History)        Assessment /Plan     For exam results, see Encounter Report.    Macular RPE mottling  -     Posterior Segment OCT Retina-Both eyes    Nuclear sclerosis, bilateral    Glaucoma screening    Astigmatism with presbyopia, bilateral    S/P LASIK surgery of both eyes      1. Hard central drusen OU  Stable per patient [facility] and history  OCT today for baseline, advised repeat annually  Amsler at home / areds or similar  2. Early vis changes, not ready for consult  3. Not suspect  4. Updated specs rx  5. Stable after 6 years, with good K health    Initially patient wants distance only Rx  Then recants this and asks for "modified" monovision so she can still see the dashboard in car  I discouraged new contact lens fit due to limited improvement with cls   Call if issues    Discussed and educated patient on current findings /plan.  RTC 1 year annual exam and repeat OCT, prn if any changes / issues                           "

## 2017-09-06 NOTE — PATIENT INSTRUCTIONS
Using the Amsler Grid  If you are at risk for vision loss, you may be told to check your eyesight regularly using the Amsler grid. Below is the grid and instructions for using it.         The Amsler grid helps you track changes in your vision.    How to Use the Amsler Grid  1. Use the grid in a well-lighted area.  2. Wear glasses or contact lenses if you usually wear them.  3. Hold the grid at your normal reading distance (about 16 inches).  4. Cover your left eye.  5. With your right eye, look at the dot in the center of the grid.  6. While looking at the dot, notice if any of the lines look wavy, if any lines disappear, or if the boxes change shape.  7. Write down on a piece of paper any vision changes from the last time you used the grid.  8. Now repeat the exercise, this time covering your right eye.  9. Call your doctor right away if you notice any vision changes.  How Often Should I Check My Vision?  Use the Amsler grid as often as your eye doctor suggests. Keep the grid where youll remember to use it. Call your eye doctor right away if you notice any changes with your eyesight. This includes if your vision improves.  © 1091-3599 [facility], [address] [place] [address], [place] [address]. All rights reserved. This information is not intended as a substitute for professional medical care. Always follow your healthcare professional's instructions.FLASHES / FLOATERS / POSTERIOR VITREOUS DETACHMENT    Call the clinic if you have any further changes in symptoms.  Including:  Increased numbers of floaters or flashing lights, dimness or darkness that moves through or stays constant in your vision, or any pain in the eye (s).            DRY EYES:  Use Over The Counter artificial tears as needed for dry eye symptoms.  Some common brands include:  Systane, Optive, and Refresh.  These drops can be used as frequently as desired, but may be most helpful use during long periods of concentrated work.  For example, reading /  "working at the computer.  Avoid drops that "get redness out", as these contain medication that may further irritate the eyes.    ALLERGY EYES / SYMPTOMS:    Over the counter medications include--Zaditor and Alaway  Use as directed 1-2 drops daily for symptoms of itching / watering eyes.  These drops will not help for dry eye or exposure symptoms.    Early Cataracts--not visually significant for surgery consultation.    What Are Cataracts?  A clear lens in the eye focuses light. This lets the eye see images sharply. With age, the lens slowly becomes cloudy. The cloudy lens is a cataract. A cataract scatters light and makes it hard for the eye to focus. Cataracts often form in both eyes. But one lens may cloud faster than the other.      The Aging of Your Lens    Your lens may cloud so slowly that you don`t notice any vision changes at first. But as the cataract gets worse, the eye has a harder time focusing. In early stages, glasses may help you see better. As the lens gets cloudier, your doctor may recommend surgery to restore your vision.    "

## 2018-01-29 ENCOUNTER — OFFICE VISIT (OUTPATIENT)
Dept: ORTHOPEDICS | Facility: CLINIC | Age: 68
End: 2018-01-29
Payer: MEDICARE

## 2018-01-29 VITALS — BODY MASS INDEX: 28.24 KG/M2 | WEIGHT: 165.38 LBS | HEIGHT: 64 IN

## 2018-01-29 DIAGNOSIS — M17.11 PRIMARY OSTEOARTHRITIS OF RIGHT KNEE: Primary | ICD-10-CM

## 2018-01-29 PROCEDURE — 20610 DRAIN/INJ JOINT/BURSA W/O US: CPT | Mod: S$PBB,RT,, | Performed by: PHYSICIAN ASSISTANT

## 2018-01-29 PROCEDURE — 20610 DRAIN/INJ JOINT/BURSA W/O US: CPT | Mod: PBBFAC | Performed by: PHYSICIAN ASSISTANT

## 2018-01-29 PROCEDURE — 99999 PR PBB SHADOW E&M-EST. PATIENT-LVL III: CPT | Mod: PBBFAC,,, | Performed by: PHYSICIAN ASSISTANT

## 2018-01-29 PROCEDURE — 99213 OFFICE O/P EST LOW 20 MIN: CPT | Mod: PBBFAC | Performed by: PHYSICIAN ASSISTANT

## 2018-01-29 PROCEDURE — 99212 OFFICE O/P EST SF 10 MIN: CPT | Mod: 25,S$PBB,, | Performed by: PHYSICIAN ASSISTANT

## 2018-01-29 RX ORDER — TRIAMCINOLONE ACETONIDE 40 MG/ML
60 INJECTION, SUSPENSION INTRA-ARTICULAR; INTRAMUSCULAR
Status: COMPLETED | OUTPATIENT
Start: 2018-01-29 | End: 2018-01-29

## 2018-01-29 RX ORDER — MELOXICAM 15 MG/1
15 TABLET ORAL DAILY
Qty: 30 TABLET | Refills: 3 | Status: SHIPPED | OUTPATIENT
Start: 2018-01-29 | End: 2018-01-30

## 2018-01-29 RX ADMIN — TRIAMCINOLONE ACETONIDE 60 MG: 40 INJECTION, SUSPENSION INTRA-ARTICULAR; INTRAMUSCULAR at 10:01

## 2018-01-29 NOTE — PROGRESS NOTES
Pamella Yuan is 67 y.o. here for R knee steroid injection. She had injection 5/9/2017 until now. She is going on a trip to [place] and would like an injection prior to leaving. She denies trauma, change in nature of knee pain. Denies swelling, instability, mechanical symptoms. Takes Mobic prn.     Exam:   Right knee:  No swelling.   No TTP.    XR: moderate DJD    Assessment:   OA right knee    Plan:  Inject today.  Refill Mobic.  RTC PRN.    Knee Injection Procedure Note    Pre-operative Diagnosis: right knee degenerative arthritis    Post-operative Diagnosis: same    Indications: right knee pain    Anesthesia: none    Procedure Details     Verbal consent was obtained for the procedure. The injection site was identified and the skin was prepared with alcohol. The right knee was injected from an anterolateral approach with 1.5 ml of Kenalog and 3 ml Lidocaine under sterile technique using a 22 gauge needle. The needle was removed and the area cleansed and dressed.    Complications:  None; patient tolerated the procedure well.    she was advised to rest the knee today, using ice and elevation as needed for comfort and swelling. she did receive immediate relief of the knee pain. she was told this would be short lived and is secondary to the lidocaine. she may have an increase in discomfort tonight followed by steady improvement over the next several days. It may take 1-3 weeks following the injection to get the full benefit of the medication.

## 2018-01-30 ENCOUNTER — OFFICE VISIT (OUTPATIENT)
Dept: INTERNAL MEDICINE | Facility: CLINIC | Age: 68
End: 2018-01-30
Payer: MEDICARE

## 2018-01-30 VITALS
DIASTOLIC BLOOD PRESSURE: 78 MMHG | HEART RATE: 103 BPM | SYSTOLIC BLOOD PRESSURE: 110 MMHG | HEIGHT: 64 IN | OXYGEN SATURATION: 99 % | WEIGHT: 167.31 LBS | BODY MASS INDEX: 28.56 KG/M2

## 2018-01-30 DIAGNOSIS — R53.83 FATIGUE, UNSPECIFIED TYPE: ICD-10-CM

## 2018-01-30 DIAGNOSIS — K90.0 CELIAC DISEASE: ICD-10-CM

## 2018-01-30 DIAGNOSIS — M47.26 OSTEOARTHRITIS OF SPINE WITH RADICULOPATHY, LUMBAR REGION: ICD-10-CM

## 2018-01-30 DIAGNOSIS — M25.50 ARTHRALGIA, UNSPECIFIED JOINT: ICD-10-CM

## 2018-01-30 DIAGNOSIS — E78.5 HYPERLIPIDEMIA, UNSPECIFIED HYPERLIPIDEMIA TYPE: ICD-10-CM

## 2018-01-30 PROCEDURE — 99214 OFFICE O/P EST MOD 30 MIN: CPT | Mod: PBBFAC,PO | Performed by: INTERNAL MEDICINE

## 2018-01-30 PROCEDURE — 99999 PR PBB SHADOW E&M-EST. PATIENT-LVL IV: CPT | Mod: PBBFAC,,, | Performed by: INTERNAL MEDICINE

## 2018-01-30 PROCEDURE — 99213 OFFICE O/P EST LOW 20 MIN: CPT | Mod: S$PBB,,, | Performed by: INTERNAL MEDICINE

## 2018-01-30 PROCEDURE — 90662 IIV NO PRSV INCREASED AG IM: CPT | Mod: PBBFAC,PO

## 2018-01-30 NOTE — PROGRESS NOTES
"HISTORY OF PRESENT ILLNESS:  Pt. is a 67 y.o. female presents for monitoring of her hyperlipidemia, arthralgias, celiac disease.  LDL 5/19/17 WNL on current meds. Pain in left lumbar area radiating to left abdomin that occurs primarily at night. Occurring since March intermittently, has become progressively worse. Believes could be due to left leg being longer than right following hip replacement. Bad enough to keep her awake at night. Denies any paraesthesias. Aspirin and Alieve tend to help pain. Worse following lying in recliner prior to bed. Activity during the day additionally exacerbates pain in the evening. Endorses decreased energy which she contributes to lack of sleep. Celiac disease in check when home, only flair when travelling. Influenza vaccine Today.     11/20/15:  "There is degenerative disk disease at all levels of the lumbar spine with disk space narrowing.  Vertebral end plate osteophytes are present anteriorly and laterally at the L2, L3, and L4 levels.  There is degenerative facet arthrosis at the L4-5 and L5-S1 levels."    Health Maintenance Topics with due status: Not Due       Topic Last Completion Date    Colonoscopy 09/15/2010    TETANUS VACCINE 11/20/2015    DEXA SCAN 11/20/2015    Pneumococcal (65+) 03/10/2017    Lipid Panel 05/19/2017    Mammogram 11/16/2017     Health Maintenance Due   Topic Date Due    Influenza Vaccine  08/01/2017 Today       Lab Results   Component Value Date    WBC 7.05 03/10/2017    HGB 14.3 03/10/2017    HCT 43.3 03/10/2017     03/10/2017    CHOL 185 05/19/2017    TRIG 64 05/19/2017    HDL 65 05/19/2017    LDLCALC 107.2 05/19/2017    ALT 33 05/19/2017    AST 23 05/19/2017     03/10/2017    K 3.8 03/10/2017     03/10/2017    CREATININE 0.7 03/10/2017    BUN 11 03/10/2017    CO2 29 03/10/2017    ALBUMIN 3.6 03/10/2017    TSH 2.356 03/10/2017    INR 0.9 02/02/2016       Past Medical History:   Diagnosis Date    Arthritis     Celiac disease     " with liver damage    Dementia     Hip deformity     Macular degeneration        Past Surgical History:   Procedure Laterality Date    CERVICAL FUSION  2000    CHOLECYSTECTOMY  2003    HIP FRACTURE SURGERY Left 1958    HIP SURGERY Right 02/18/2016    THR    HYSTERECTOMY  2000    MIGUELINA with BSO    LASIK Bilateral     corrected for monovision    OOPHORECTOMY  2000       Social History     Social History    Marital status:      Spouse name: N/A    Number of children: 1    Years of education: N/A     Occupational History    retired        of [employer]     Social History Main Topics    Smoking status: Never Smoker    Smokeless tobacco: Never Used    Alcohol use No    Drug use: No    Sexual activity: Yes     Partners: Male     Other Topics Concern    None     Social History Narrative    None       ROS:  GENERAL: No fever, chills, or weight loss. Fatigue due to lack of sleep.  SKIN: No rashes, itching or changes in color or texture of skin.  HEAD: No headaches or recent head trauma.  EARS: Denies ear pain, discharge or vertigo.  NOSE: No loss of smell, no epistaxis or postnasal drip.  MOUTH & THROAT: No hoarseness or change in voice. No excessive gum bleeding.  NODES: Denies swollen glands.  CHEST: Denies MARTINEZ, cyanosis, wheezing, cough and sputum production.  CARDIOVASCULAR: Denies chest pain, PND, orthopnea or reduced exercise tolerance.  ABDOMEN: Appetite fine. No weight loss. Denies abdominal pain, hematemesis or blood in stool. Positive for constipation and diahrrea (IBS)  URINARY: No flank pain, dysuria or hematuria.  PERIPHERAL VASCULAR: No claudication or cyanosis. No edema. Positive for swelling in knees, receives cortisone shots.   MUSCULOSKELETAL: No joint stiffness or swelling. Positive back pain/wraps around to front at approx. T11/T12  NEUROLOGIC: Denies numbness    PE:   Vitals:   Vitals:    01/30/18 0859   BP: 110/78   Pulse: 103     GENERAL: no acute  distress, A&Ox3, comfortable.  Female with BMI of 28   HEENT: tympanic membranes clear, nasal mucosa pink, no pharyngeal erythema or exudate  NECK: supple, no cervical lymphadenopathy, no thyromegaly; no supraclavicular nodes;   CHEST:  clear to auscultation bilaterally, no crackles or wheeze; no increased work of breathing;  CARDIOVASCULAR: regular rate and rhythm, no rubs, murmurs or gallops.  ABDOMEN: normal bowel sounds, soft non-tender, non-distended; no palpable organomegaly;   EXT: no clubbing, cyanosis or edema.   TRUNK: left thoracolumbar pain approx. T11/T12    ASSESSMENT/PLAN:    Arthralgia, unspecified joint/Osteoarthritis of spine with radiculopathy, lumbar region: has meloxicam that she takes prn; also discussed salon pas patches with lidocaine;  -     Ambulatory consult to Physical Therapy  -     X-Ray Thoracolumbar Spine AP Lateral; Future; Expected date: 01/30/2018      Hyperlipidemia, unspecified hyperlipidemia type  -     Lipid panel; Future; Expected date: 01/30/2018  -     Comprehensive metabolic panel; Future; Expected date: 01/30/2018    Fatigue, unspecified type  -     CBC auto differential; Future; Expected date: 01/30/2018  -     TSH; Future; Expected date: 01/30/2018    Celiac disease  -     CBC auto differential; Future; Expected date: 01/30/2018          Medication List with Changes/Refills   Current Medications    B COMPLEX VITAMINS TABLET    Take 1 tablet by mouth once daily.    ESTRADIOL (ESTRACE) 0.01 % (0.1 MG/GRAM) VAGINAL CREAM    Place 1 g vaginally twice a week.    [id number]-OVL-[id number]-OM6-LIP-ASTX (KRILL OIL, OMEGA 3 & 6,) 1,500-165-67.5 MG CAP    Take 1 capsule by mouth once daily.    PRAVASTATIN (PRAVACHOL) 20 MG TABLET    TAKE 1 TABLET BY MOUTH EVERY DAY    VIT C/E/ZN/COPPR/LUTEIN/ZEAXAN (PRESERVISION AREDS 2 ORAL)    Take 2 tablets by mouth every morning.   Discontinued Medications    MELOXICAM (MOBIC) 15 MG TABLET    Take 1 tablet (15 mg total) by mouth once daily.     Call  if condition changes or worsens.

## 2018-02-02 ENCOUNTER — LAB VISIT (OUTPATIENT)
Dept: LAB | Facility: HOSPITAL | Age: 68
End: 2018-02-02
Attending: INTERNAL MEDICINE
Payer: MEDICARE

## 2018-02-02 ENCOUNTER — HOSPITAL ENCOUNTER (OUTPATIENT)
Dept: RADIOLOGY | Facility: HOSPITAL | Age: 68
Discharge: HOME | End: 2018-02-02
Attending: INTERNAL MEDICINE
Payer: MEDICARE

## 2018-02-02 DIAGNOSIS — K90.0 CELIAC DISEASE: ICD-10-CM

## 2018-02-02 DIAGNOSIS — E78.5 HYPERLIPIDEMIA, UNSPECIFIED HYPERLIPIDEMIA TYPE: ICD-10-CM

## 2018-02-02 DIAGNOSIS — R53.83 FATIGUE, UNSPECIFIED TYPE: ICD-10-CM

## 2018-02-02 DIAGNOSIS — M47.26 OSTEOARTHRITIS OF SPINE WITH RADICULOPATHY, LUMBAR REGION: ICD-10-CM

## 2018-02-02 LAB
ALBUMIN SERPL BCP-MCNC: 3.4 G/DL
ALP SERPL-CCNC: 137 U/L
ALT SERPL W/O P-5'-P-CCNC: 28 U/L
ANION GAP SERPL CALC-SCNC: 12 MMOL/L
AST SERPL-CCNC: 19 U/L
BASOPHILS # BLD AUTO: 0.01 K/UL
BASOPHILS NFR BLD: 0.1 %
BILIRUB SERPL-MCNC: 0.5 MG/DL
BUN SERPL-MCNC: 16 MG/DL
CALCIUM SERPL-MCNC: 9.4 MG/DL
CHLORIDE SERPL-SCNC: 106 MMOL/L
CHOLEST SERPL-MCNC: 244 MG/DL
CHOLEST/HDLC SERPL: 4.5 {RATIO}
CO2 SERPL-SCNC: 24 MMOL/L
CREAT SERPL-MCNC: 0.8 MG/DL
DIFFERENTIAL METHOD: NORMAL
EOSINOPHIL # BLD AUTO: 0 K/UL
EOSINOPHIL NFR BLD: 0 %
ERYTHROCYTE [DISTWIDTH] IN BLOOD BY AUTOMATED COUNT: 14.1 %
EST. GFR  (AFRICAN AMERICAN): >60 ML/MIN/1.73 M^2
EST. GFR  (NON AFRICAN AMERICAN): >60 ML/MIN/1.73 M^2
GLUCOSE SERPL-MCNC: 83 MG/DL
HCT VFR BLD AUTO: 42.1 %
HDLC SERPL-MCNC: 54 MG/DL
HDLC SERPL: 22.1 %
HGB BLD-MCNC: 13.5 G/DL
IMM GRANULOCYTES # BLD AUTO: 0.04 K/UL
IMM GRANULOCYTES NFR BLD AUTO: 0.5 %
LDLC SERPL CALC-MCNC: 171 MG/DL
LYMPHOCYTES # BLD AUTO: 2.2 K/UL
LYMPHOCYTES NFR BLD: 25.1 %
MCH RBC QN AUTO: 28.6 PG
MCHC RBC AUTO-ENTMCNC: 32.1 G/DL
MCV RBC AUTO: 89 FL
MONOCYTES # BLD AUTO: 0.7 K/UL
MONOCYTES NFR BLD: 7.7 %
NEUTROPHILS # BLD AUTO: 5.8 K/UL
NEUTROPHILS NFR BLD: 66.6 %
NONHDLC SERPL-MCNC: 190 MG/DL
NRBC BLD-RTO: 0 /100 WBC
PLATELET # BLD AUTO: 234 K/UL
PMV BLD AUTO: 10.9 FL
POTASSIUM SERPL-SCNC: 3.8 MMOL/L
PROT SERPL-MCNC: 7.3 G/DL
RBC # BLD AUTO: 4.72 M/UL
SODIUM SERPL-SCNC: 142 MMOL/L
TRIGL SERPL-MCNC: 95 MG/DL
TSH SERPL DL<=0.005 MIU/L-ACNC: 2.23 UIU/ML
WBC # BLD AUTO: 8.65 K/UL

## 2018-02-02 PROCEDURE — 72080 X-RAY EXAM THORACOLMB 2/> VW: CPT | Mod: 26,,, | Performed by: RADIOLOGY

## 2018-02-02 PROCEDURE — 80053 COMPREHEN METABOLIC PANEL: CPT

## 2018-02-02 PROCEDURE — 84443 ASSAY THYROID STIM HORMONE: CPT

## 2018-02-02 PROCEDURE — 36415 COLL VENOUS BLD VENIPUNCTURE: CPT | Mod: PO

## 2018-02-02 PROCEDURE — 72080 X-RAY EXAM THORACOLMB 2/> VW: CPT | Mod: TC,PO

## 2018-02-02 PROCEDURE — 85025 COMPLETE CBC W/AUTO DIFF WBC: CPT

## 2018-02-02 PROCEDURE — 80061 LIPID PANEL: CPT

## 2018-02-05 ENCOUNTER — CLINICAL SUPPORT (OUTPATIENT)
Dept: REHABILITATION | Facility: HOSPITAL | Age: 68
End: 2018-02-05
Attending: INTERNAL MEDICINE
Payer: MEDICARE

## 2018-02-05 DIAGNOSIS — G89.29 CHRONIC LEFT-SIDED LOW BACK PAIN WITHOUT SCIATICA: Primary | ICD-10-CM

## 2018-02-05 DIAGNOSIS — M54.50 CHRONIC LEFT-SIDED LOW BACK PAIN WITHOUT SCIATICA: Primary | ICD-10-CM

## 2018-02-05 DIAGNOSIS — R29.898 WEAKNESS OF LEFT HIP: ICD-10-CM

## 2018-02-05 PROCEDURE — 97110 THERAPEUTIC EXERCISES: CPT | Mod: PN

## 2018-02-05 PROCEDURE — 97161 PT EVAL LOW COMPLEX 20 MIN: CPT | Mod: PN

## 2018-02-05 PROCEDURE — G8990 OTHER PT/OT CURRENT STATUS: HCPCS | Mod: CK,PN

## 2018-02-05 PROCEDURE — G8991 OTHER PT/OT GOAL STATUS: HCPCS | Mod: CJ,PN

## 2018-02-05 NOTE — PATIENT INSTRUCTIONS
Hip External Rotation With Pillow: Transverse Plane Stability        One knee bent, one leg straight, on pillow. Slowly roll bent knee out. Be sure pelvis does not rotate. Do ___ times. Restabilize pelvis. Repeat with other leg.  Do ___ sets, ___ times per day.     [email or web address]://[employer]/21     Copyright © [employer]. All rights reserved.   Hip External Rotation: Transverse Plane Stability        One knee bent, one leg straight. Slowly roll bent knee out. Be sure pelvis does not rotate. Do ___ times. Restabilize pelvis. Repeat with other leg.  Do ___ sets, ___ times per day.     https://[employer]/23     Copyright © [employer]. All rights reserved.   Rock Backward Hips Neutral: 4 Point        On hands and knees, feet apart, knees aligned under hips. Keep back flat (neutral). Rock backward, moving at hip not from back.  Do ___ times, ___ times per day.     [email or web address]     Copyright © [employer]. All rights reserved.   (Home) Flexion: Pelvic Tilt        Lie with neck supported, knees bent, feet flat. Tighten and suck stomach in, pushing back down against surface. Do not push down with legs.  Repeat ____ times per set. Do ____ sets per session. Do ____ sessions per week.    Copyright © [employer]. All rights reserved.

## 2018-02-05 NOTE — PROGRESS NOTES
OUTPATIENT PHYSICAL THERAPY  PHYSICAL THERAPY EVALUATION    Name: Pamella Yuan  Clinic Number: [id number]    Evaluation Date: 02/05/2018  Visit #: 1/20   Precautions:  Standard     Diagnosis: Low back pain    Physician: Pooja Vazquez MD  Treatment Orders: PT Eval and Treat  Past Medical History:   Diagnosis Date    Arthritis     Celiac disease     with liver damage    Dementia     Hip deformity     Macular degeneration      Current Outpatient Prescriptions   Medication Sig    b complex vitamins tablet Take 1 tablet by mouth once daily.    estradiol (ESTRACE) 0.01 % (0.1 mg/gram) vaginal cream Place 1 g vaginally twice a week.    krill-om3-dha-epa-om6-lip-astx (KRILL OIL, OMEGA 3 & 6,) 1,500-165-67.5 mg Cap Take 1 capsule by mouth once daily.    pravastatin (PRAVACHOL) 20 MG tablet TAKE 1 TABLET BY MOUTH EVERY DAY    VIT C/E/ZN/COPPR/LUTEIN/ZEAXAN (PRESERVISION AREDS 2 ORAL) Take 2 tablets by mouth every morning.     No current facility-administered medications for this visit.      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   Work: Retired    Subjective   History of Present Illness: Low back pain on the left side which keeps her awake at night.  Reports a leg length discrepancy which she wears an insert for on the left leg. She has had 3 hip surgeries in the past with a left HARRIET 2 yrs ago.   PMH of cervical fusion C4-6, Left HARRIET 2 yrs ago,   Chief complaint: Left sided low back pain which keeps her awake at night  Pain: current 3/10, worst 8/10, best 0/10, Tight  Radicular  symptoms: none  Aggravating factors: activity, walking, lying flat  Easing factors: aleve  Pts goals: Decrease pain and improve sleep quality decreased difficulty falling asleep    Objective   Mental status: alert    Static Postural Assessment:  Pelvic assymmetry Left hip higher than Right;  APT    GAIT: Pamella ambulates with no assistive device with independently.     GAIT DEVIATIONS: Pamella displays decreased kay    Dynamic Movement Screen:  - Cervical Flexion:    - Cervical Ext: DN  - Cervical Rot R: DN  - Cervical Rot L:  DN  - Trunk Ext: DP  - Trunk Rot R: DP  - Trunk Rot L: DP   - SLS R: FN  - SLS L: DN    * FN =Functional Non-painful   * FP = Functional Painful   * DN = Dysfunctional Non-painful   * DP = Dysfunctional Painful      - Overhead Squat Assessment:  NT     ROM:  25% Left SB tight on R side   R rot painful > L   Extension painful in low back 25% limited   Left hip ER 20 degrees with pain     Strength:   L Glute Med = 1   L Piriformis = 1   R Glute Med = 2   R Piriformis = 2     Level of strength:   3 = Normal   2 = Compensation (recruits other muscles)  1= Weak    Special Tests:  + CARY  LLE    Palpation:  SI jt dysfunction ~ L anterior rotation  TTP R hip flexor, piriformis     Pt/family was provided educational information, including: role of PT, goals for PT, scheduling - pt verbalized understanding. Discussed insurance plan with pt.     TREATMENT   Time In: 915  AM  Time Out: 100 AM     Discussed Plan of Care with patient: Yes    Pamella received 15 minutes of therapeutic exercise including:   -Supine hip ER  Stretches x 20   -PPT x 20    -SP rock backs x 20    -Side lying hip abduction 2 x 10     Written Home Exercises Provided: yes  Exercises were reviewed and Pamella was able to demonstrate them prior to the end of the session. Pt received a written copy of exercises to perform at home. Pamella demonstrated good  understanding of the education provided.     Assessment   Pamella is a 67 y.o.  female referred to outpatient physical therapy with a medical diagnosis of  Left sided low back pain with Left hip weakness.     Demonstrates limitations as described in the problem list.  Medical necessity is demonstrated by the following IMPAIRMENTS/PROBLEMS:  weakness, impaired balance, decreased lower extremity function, pain, decreased ROM, impaired joint extensibility, impaired muscle length and orthopedic precautions    Positive prognostic factors include motivated.   Negative prognostic factors include chronicity of sx, prior sx .   Pt prognosis is Good.    Pt will benefit from skilled outpatient physical therapy to address the above stated deficits, provide pt/family education, and to maximize pt's level of independence.       History  Co-morbidities and personal factors that may impact the plan of care Examination  Body Structures and Functions, activity limitations and participation restrictions that may impact the plan of care Clinical Presentation   Decision Making/ Complexity Score   Co-morbidities:   difficulty sleeping and prior hip surgery        Personal Factors:   no deficits Body Regions:   back  lower extremities    Body Systems:   gross symmetry  ROM  strength  balance  gait  motor control    Activity limitations:   Learning and applying knowledge  no deficits    General Tasks and Commands  no deficits    Communication   none     Mobility  walking    Self care  no deficits    Domestic Life  no deficits    Life Areas  no deficits    Community and Social Life  no deficits    Participation Restrictions:    HARRIET prec          stable and uncomplicated            low low low low       Anticated Barriers for physical therapy:  Insurance limitations     GOALS: 8-12  weeks:     8-12 weeks:   -Independent with HEP for improved glute activation and core stabilization   -Improved pelvic alignment, able to self -correct with MET  -Improved Left hip ER 10 degrees for improved joint function   -Improved  posterior glute activation on Left side   -Decreased pain in low back to minimal with sleeping and daily activity   -FOTO reporting to predicted level of limitation       Plan     Certification Period: 2/5/18  - 5/4/18     Outpatient physical therapy 1- 2 times weekly to include: pt ed, HEP, therapeutic exercises, therapeutic activities, neuromuscular re-education/ balance exercises, manual therapy, and modalities prn. Cont PT for  8-12  weeks. Pt may be seen by PTA as part of the rehabilitation team.     I certify the need for these services furnished under this plan of treatment and while under my care.    Jcarlos Hernandez, PT

## 2018-02-08 ENCOUNTER — PATIENT MESSAGE (OUTPATIENT)
Dept: INTERNAL MEDICINE | Facility: CLINIC | Age: 68
End: 2018-02-08

## 2018-02-08 ENCOUNTER — CLINICAL SUPPORT (OUTPATIENT)
Dept: REHABILITATION | Facility: HOSPITAL | Age: 68
End: 2018-02-08
Attending: INTERNAL MEDICINE
Payer: MEDICARE

## 2018-02-08 DIAGNOSIS — E78.5 HYPERLIPIDEMIA, UNSPECIFIED HYPERLIPIDEMIA TYPE: Primary | ICD-10-CM

## 2018-02-08 DIAGNOSIS — M54.50 CHRONIC LEFT-SIDED LOW BACK PAIN WITHOUT SCIATICA: Primary | ICD-10-CM

## 2018-02-08 DIAGNOSIS — R29.898 WEAKNESS OF LEFT HIP: ICD-10-CM

## 2018-02-08 DIAGNOSIS — G89.29 CHRONIC LEFT-SIDED LOW BACK PAIN WITHOUT SCIATICA: Primary | ICD-10-CM

## 2018-02-08 PROCEDURE — 97110 THERAPEUTIC EXERCISES: CPT | Mod: PN

## 2018-02-08 NOTE — PLAN OF CARE
OUTPATIENT PHYSICAL THERAPY  PHYSICAL THERAPY EVALUATION    Name: Pamella Yuan  Clinic Number: [id number]    Evaluation Date: 02/05/2018  Visit #: 1/20   Precautions:  Standard     Diagnosis: Low back pain    Physician: Pooja Vazquez MD  Treatment Orders: PT Eval and Treat  Past Medical History:   Diagnosis Date    Arthritis     Celiac disease     with liver damage    Dementia     Hip deformity     Macular degeneration      Current Outpatient Prescriptions   Medication Sig    b complex vitamins tablet Take 1 tablet by mouth once daily.    estradiol (ESTRACE) 0.01 % (0.1 mg/gram) vaginal cream Place 1 g vaginally twice a week.    krill-om3-dha-epa-om6-lip-astx (KRILL OIL, OMEGA 3 & 6,) 1,500-165-67.5 mg Cap Take 1 capsule by mouth once daily.    pravastatin (PRAVACHOL) 20 MG tablet TAKE 1 TABLET BY MOUTH EVERY DAY    VIT C/E/ZN/COPPR/LUTEIN/ZEAXAN (PRESERVISION AREDS 2 ORAL) Take 2 tablets by mouth every morning.     No current facility-administered medications for this visit.      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   Work: Retired    Subjective   History of Present Illness: Low back pain on the left side which keeps her awake at night.  Reports a leg length discrepancy which she wears an insert for on the left leg. She has had 3 hip surgeries in the past with a left HARRIET 2 yrs ago.   PMH of cervical fusion C4-6, Left HARRIET 2 yrs ago,   Chief complaint: Left sided low back pain which keeps her awake at night  Pain: current 3/10, worst 8/10, best 0/10, Tight  Radicular  symptoms: none  Aggravating factors: activity, walking, lying flat  Easing factors: aleve  Pts goals: Decrease pain and improve sleep quality decreased difficulty falling asleep    Objective   Mental status: alert    Static Postural Assessment:  Pelvic assymmetry Left hip higher than Right;  APT    GAIT: Pamella ambulates with no assistive device with independently.     GAIT DEVIATIONS: Pamella displays decreased kay    Dynamic Movement Screen:  - Cervical Flexion:    - Cervical Ext: DN  - Cervical Rot R: DN  - Cervical Rot L:  DN  - Trunk Ext: DP  - Trunk Rot R: DP  - Trunk Rot L: DP   - SLS R: FN  - SLS L: DN    * FN =Functional Non-painful   * FP = Functional Painful   * DN = Dysfunctional Non-painful   * DP = Dysfunctional Painful      - Overhead Squat Assessment:  NT     ROM:  25% Left SB tight on R side   R rot painful > L   Extension painful in low back 25% limited   Left hip ER 20 degrees with pain     Strength:   L Glute Med = 1   L Piriformis = 1   R Glute Med = 2   R Piriformis = 2     Level of strength:   3 = Normal   2 = Compensation (recruits other muscles)  1= Weak    Special Tests:  + CARY  LLE    Palpation:  SI jt dysfunction ~ L anterior rotation  TTP R hip flexor, piriformis     Pt/family was provided educational information, including: role of PT, goals for PT, scheduling - pt verbalized understanding. Discussed insurance plan with pt.     TREATMENT   Time In: 915  AM  Time Out: 100 AM     Discussed Plan of Care with patient: Yes    Pamella received 15 minutes of therapeutic exercise including:   -Supine hip ER  Stretches x 20   -PPT x 20    -SP rock backs x 20    -Side lying hip abduction 2 x 10     Written Home Exercises Provided: yes  Exercises were reviewed and Pamella was able to demonstrate them prior to the end of the session. Pt received a written copy of exercises to perform at home. Pamella demonstrated good  understanding of the education provided.     Assessment   Pamella is a 67 y.o.  female referred to outpatient physical therapy with a medical diagnosis of  Left sided low back pain with Left hip weakness.     Demonstrates limitations as described in the problem list.  Medical necessity is demonstrated by the following IMPAIRMENTS/PROBLEMS:  weakness, impaired balance, decreased lower extremity function, pain, decreased ROM, impaired joint extensibility, impaired muscle length and orthopedic precautions    Positive prognostic factors include motivated.   Negative prognostic factors include chronicity of sx, prior sx .   Pt prognosis is Good.    Pt will benefit from skilled outpatient physical therapy to address the above stated deficits, provide pt/family education, and to maximize pt's level of independence.       History  Co-morbidities and personal factors that may impact the plan of care Examination  Body Structures and Functions, activity limitations and participation restrictions that may impact the plan of care Clinical Presentation   Decision Making/ Complexity Score   Co-morbidities:   difficulty sleeping and prior hip surgery        Personal Factors:   no deficits Body Regions:   back  lower extremities    Body Systems:   gross symmetry  ROM  strength  balance  gait  motor control    Activity limitations:   Learning and applying knowledge  no deficits    General Tasks and Commands  no deficits    Communication   none     Mobility  walking    Self care  no deficits    Domestic Life  no deficits    Life Areas  no deficits    Community and Social Life  no deficits    Participation Restrictions:    HARRIET prec          stable and uncomplicated            low low low low       Anticated Barriers for physical therapy:  Insurance limitations     GOALS: 8-12  weeks:     8-12 weeks:   -Independent with HEP for improved glute activation and core stabilization   -Improved pelvic alignment, able to self -correct with MET  -Improved Left hip ER 10 degrees for improved joint function   -Improved  posterior glute activation on Left side   -Decreased pain in low back to minimal with sleeping and daily activity   -FOTO reporting to predicted level of limitation       Plan     Certification Period: 2/5/18  - 5/4/18     Outpatient physical therapy 1- 2 times weekly to include: pt ed, HEP, therapeutic exercises, therapeutic activities, neuromuscular re-education/ balance exercises, manual therapy, and modalities prn. Cont PT for  8-12  weeks. Pt may be seen by PTA as part of the rehabilitation team.     I certify the need for these services furnished under this plan of treatment and while under my care.    Jcarlos Hernandez, PT

## 2018-02-08 NOTE — PROGRESS NOTES
Name: Pamella Yuan  Clinic Number: [id number]  Date of Treatment: 02/08/2018   Diagnosis:   Encounter Diagnoses   Name Primary?    Chronic left-sided low back pain without sciatica Yes    Weakness of left hip        Time in: 100 PM   Time Out: 200 PM   Total Treatment Time: 60   Group Time: 0      Subjective:    Pamella reports no change in sx, compliant with exercises.    Patient reports their pain to be 3/10 on a 0-10 scale with 0 being no pain and 10 being the worst pain imaginable.    Objective    Pamella received therapeutic exercises to develop strength, ROM, flexibility, posture and core stabilization for 55  minutes including:     MET for pelvic alignment   Left hip ER stretch   PPTx 20 + bridging with laila hip abd using YTB 3 x 10   S/L hip abd and clamshells x 20     QP rock backs and cat/camel x 20   Prone glute sets  3 x 10     Manual techniques ~ L QL MFR.       Written Home Exercises Provided: added clamshells   Pt demo good understanding of the education provided. Pamella demonstrated good return demonstration of activities.     Assessment: brynn session well, cont with decreased left hip ROM, Strength and neuromuscular control       Pt will continue to benefit from skilled PT intervention. Medical Necessity is demonstrated by:  Pain limits function of effected part for some activities, Requires skilled supervision to complete and progress HEP and Weakness.    Patient is making good progress towards established goals.    New/Revised goals:  None       Plan:  Continue with established Plan of Care towards PT goals.

## 2018-02-19 ENCOUNTER — CLINICAL SUPPORT (OUTPATIENT)
Dept: REHABILITATION | Facility: HOSPITAL | Age: 68
End: 2018-02-19
Attending: INTERNAL MEDICINE
Payer: MEDICARE

## 2018-02-19 DIAGNOSIS — R29.898 WEAKNESS OF LEFT HIP: Primary | ICD-10-CM

## 2018-02-19 PROCEDURE — 97110 THERAPEUTIC EXERCISES: CPT | Mod: PN

## 2018-02-19 NOTE — PROGRESS NOTES
"Name: Pamella Yuan  [facility] Number: [id number]  Date of Treatment: 02/19/2018   Diagnosis:   No diagnosis found.    Time in: 100 PM   Time Out: 200 PM   Total Treatment Time: 60   Group Time: 0      Subjective:    Pamella reports no new complaints, continues with Left sided low back pain and hip weakness.    Patient reports their pain to be 3/10 on a 0-10 scale with 0 being no pain and 10 being the worst pain imaginable.    Objective    Pamella received therapeutic exercises to develop strength, ROM, flexibility, posture and core stabilization for 55  minutes including:     MET for pelvic alignment     Left hip ER stretch     PPTx 20 + bridging with laila hip abd using YTB 3 x 10   Clamshells with YTB  3 x10   S/L hip abd 3 x 10     QP rock backs and cat/camel x 20   Shuttle 2 bands Anthony squats with YTB hip abd 3 x 10   Lateral band walk with YTB x 2 laps   Step dns 6" 2 x 10 Anthony     Hip Mobility: R Foot on chair;     Manual techniques x 5 min    ~ L QL MFR.   ~ Prone MFR Left piriformis     Written Home Exercises Provided: cheyanne   Pt demo good understanding of the education provided. Pamella demonstrated good return demonstration of activities.     Assessment:  Tolerated session well, decreased hip strength RLE     Pt will continue to benefit from skilled PT intervention. Medical Necessity is demonstrated by:  Pain limits function of effected part for some activities, Requires skilled supervision to complete and progress HEP and Weakness.    Patient is making good progress towards established goals.    New/Revised goals:  None       Plan:  Continue with established Plan of Care towards PT goals.   "

## 2018-02-22 ENCOUNTER — CLINICAL SUPPORT (OUTPATIENT)
Dept: REHABILITATION | Facility: HOSPITAL | Age: 68
End: 2018-02-22
Attending: INTERNAL MEDICINE
Payer: MEDICARE

## 2018-02-22 DIAGNOSIS — R29.898 WEAKNESS OF LEFT HIP: Primary | ICD-10-CM

## 2018-02-22 PROCEDURE — 97110 THERAPEUTIC EXERCISES: CPT | Mod: PN

## 2018-02-22 NOTE — PROGRESS NOTES
"Name: Pamella Yuan  Clinic Number: [id number]  Date of Treatment: 02/22/2018   Diagnosis:   L sided low back pain     Time in: 100 PM   Time Out: 200 PM   Total Treatment Time: 60   Group Time: 0      Subjective:    Pamella reports decreased complaints of pain in low back with sleeping.   Patient reports their pain to be 3/10 on a 0-10 scale with 0 being no pain and 10 being the worst pain imaginable.    Objective    Pamella received therapeutic exercises to develop strength, ROM, flexibility, posture and core stabilization for 55  minutes including:       MET for pelvic alignment NP  Left hip ER stretch     Pelvic PNF stretches Left sided     PPTx 20 + bridging with laila hip abd using YTB 3 x 10   Clamshells with YTB  3 x10   S/L hip abd 3 x 10     QP rock backs and cat/camel x 20  (NP)   Shuttle 2 bands Anthony squats with YTB hip abd 3 x 10   Lateral band walk with YTB x 2 laps   Step dns 6" 2 x 10 Anthony     Hip Mobility: R Foot on step: Anthony Hand swing Post @ [place]     SSL and SSR Lunges with RLE for improved mobility of L hip abduction and ER.      Manual techniques x 5 min    ~ L QL MFR.   ~ Prone MFR Left piriformis     Written Home Exercises Provided: MET for pelvic alignment, clamshells,   Pt demo good understanding of the education provided. Pamella demonstrated good return demonstration of activities.     Assessment:  Tolerated session well, Limited L hip ER and abduction mobility and stability.   Given SSR and SSL lunges to improve mobility of LLE.     Pt will continue to benefit from skilled PT intervention. Medical Necessity is demonstrated by:  Pain limits function of effected part for some activities, Requires skilled supervision to complete and progress HEP and Weakness.    Patient is making good progress towards established goals.    New/Revised goals:  None       Plan:  Continue with established Plan of Care towards PT goals.   "

## 2018-03-01 ENCOUNTER — CLINICAL SUPPORT (OUTPATIENT)
Dept: REHABILITATION | Facility: HOSPITAL | Age: 68
End: 2018-03-01
Attending: INTERNAL MEDICINE
Payer: MEDICARE

## 2018-03-01 DIAGNOSIS — G89.29 CHRONIC LEFT-SIDED LOW BACK PAIN WITHOUT SCIATICA: ICD-10-CM

## 2018-03-01 DIAGNOSIS — R29.898 WEAKNESS OF LEFT HIP: Primary | ICD-10-CM

## 2018-03-01 DIAGNOSIS — M54.50 CHRONIC LEFT-SIDED LOW BACK PAIN WITHOUT SCIATICA: ICD-10-CM

## 2018-03-01 PROCEDURE — 97110 THERAPEUTIC EXERCISES: CPT | Mod: PN

## 2018-03-01 NOTE — PROGRESS NOTES
Name: Pamella Yuan  Clinic Number: [id number]  Date of Treatment: 03/01/2018   Diagnosis:   L sided low back pain     Time in: 100 PM   Time Out: 200 PM   Total Treatment Time: 60   Group Time: 0      Subjective:    Pamella reports intemittent left sided back pain, continued improvement in sx epsecially with sleeping.  Patient reports their pain to be 3/10 on a 0-10 scale with 0 being no pain and 10 being the worst pain imaginable.    Objective    Pamella received therapeutic exercises to develop strength, ROM, flexibility, posture and core stabilization for 55 minutes including:       MET for pelvic alignment   Left hip ER stretch     Pelvic PNF stretches Left sided     PPTx 20 + bridging with laila hip abd using YTB 3 x 10   Clamshells with YTB  3 x10   S/L hip abd 3 x 10     QP rock backs and cat/camel x 20  (NP)   Shuttle 2 bands Anthony squats with YTB hip abd 3 x 10   Lateral band walk with YTB x 2 laps     Hip Mobility: R Foot on step: Anthony Hand swing Post @ [place]     SSL and SSR Lunges with RLE for improved mobility of L hip abduction and ER.      Manual techniques x 5 min    ~ L QL MFR.   ~ Prone MFR Left piriformis         Written Home Exercises Provided: MET for pelvic alignment, clamshells,   Pt demo good understanding of the education provided. Pamella demonstrated good return demonstration of activities.     Assessment:  Tolerated session well,   Continued with Limited L hip ER and abduction mobility and stability.   Given SSR and SSL lunges to improve mobility of LLE.     Pt will continue to benefit from skilled PT intervention. Medical Necessity is demonstrated by:  Pain limits function of effected part for some activities, Requires skilled supervision to complete and progress HEP and Weakness.    Patient is making good progress towards established goals.    New/Revised goals:  None       Plan:  Continue with established Plan of Care towards PT goals.

## 2018-03-05 ENCOUNTER — CLINICAL SUPPORT (OUTPATIENT)
Dept: REHABILITATION | Facility: HOSPITAL | Age: 68
End: 2018-03-05
Attending: INTERNAL MEDICINE
Payer: MEDICARE

## 2018-03-05 DIAGNOSIS — M54.50 CHRONIC LEFT-SIDED LOW BACK PAIN WITHOUT SCIATICA: ICD-10-CM

## 2018-03-05 DIAGNOSIS — G89.29 CHRONIC LEFT-SIDED LOW BACK PAIN WITHOUT SCIATICA: ICD-10-CM

## 2018-03-05 DIAGNOSIS — R29.898 WEAKNESS OF LEFT HIP: ICD-10-CM

## 2018-03-05 PROCEDURE — 97110 THERAPEUTIC EXERCISES: CPT | Mod: PN

## 2018-03-05 NOTE — PROGRESS NOTES
Name: Pamella Yuan  Clinic Number: [id number]  Date of Treatment: 03/05/2018   Diagnosis:   1. Chronic left-sided low back pain without sciatica     2. Weakness of left hip       Time in: 1:00 PM   Time Out: 1:55 PM   Total Treatment Time: 55   Group Time: 0      Subjective:    Pamella reports having done a lot of walking this past weekend which caused increased pain into the L hip and low back, but improved the next day. Patient reports their pain to be 3/10 on a 0-10 scale with 0 being no pain and 10 being the worst pain imaginable. States R knee pain has improved some as well.    Objective    Pamella received therapeutic exercises to develop strength, ROM, flexibility, posture and core stabilization for 55 minutes including:       MET for pelvic alignment   Left hip ER stretch     Pelvic PNF stretches Left sided     PPTx 20 + bridging with laila hip abd using YTB 3 x 10   Clamshells with GTB  3 x10   S/L hip abd 2 x 10   B quad set with towel roll under knees  QP rock backs and cat/camel x 20  (NP)   Shuttle 2 bands Anthony squats with YTB hip abd 3 x 10   Shuttle 2 to 1 press with 1.5 bands, emphasis on making L LE do as much work as tolerated  Lateral band walk with YTB x 2 laps     Hip Mobility: R Foot on step: Anthony Hand swing Post @ [place]     SSL and SSR Lunges with RLE for improved mobility of L hip abduction and ER.      Manual techniques x 5 min (not performed)   ~ L QL MFR.   ~ Prone MFR Left piriformis     Written Home Exercises Provided: continuation of current written HEP while out of town.   Pt demo good understanding of the education provided. Pamella demonstrated good return demonstration of activities.     Assessment:    Pamella demonstrated overall good tolerance to treatment this date. No increase of pain reported into the L hip, pain remains 3/10 post treatment. She presents with continued mild limitation of L hip mobility, especially ER. Tightness of L quad with hip extension. Posterior chain weakness with  CARMELO heller.   Pt will continue to benefit from skilled PT intervention. Medical Necessity is demonstrated by:  Pain limits function of effected part for some activities, Requires skilled supervision to complete and progress HEP and Weakness.    Patient is making good progress towards established goals.    New/Revised goals:  None       Plan:  Continue with established Plan of Care towards PT goals.

## 2018-04-02 ENCOUNTER — OFFICE VISIT (OUTPATIENT)
Dept: ORTHOPEDICS | Facility: CLINIC | Age: 68
End: 2018-04-02
Payer: MEDICARE

## 2018-04-02 ENCOUNTER — HOSPITAL ENCOUNTER (OUTPATIENT)
Dept: RADIOLOGY | Facility: HOSPITAL | Age: 68
Discharge: HOME | End: 2018-04-02
Attending: ORTHOPAEDIC SURGERY
Payer: MEDICARE

## 2018-04-02 VITALS — BODY MASS INDEX: 28.51 KG/M2 | HEIGHT: 64 IN | WEIGHT: 167 LBS

## 2018-04-02 DIAGNOSIS — M25.561 CHRONIC PAIN OF RIGHT KNEE: Primary | ICD-10-CM

## 2018-04-02 DIAGNOSIS — M17.11 PRIMARY OSTEOARTHRITIS OF RIGHT KNEE: ICD-10-CM

## 2018-04-02 DIAGNOSIS — M25.561 RIGHT KNEE PAIN, UNSPECIFIED CHRONICITY: Primary | ICD-10-CM

## 2018-04-02 DIAGNOSIS — G89.29 CHRONIC PAIN OF RIGHT KNEE: Primary | ICD-10-CM

## 2018-04-02 DIAGNOSIS — M25.561 RIGHT KNEE PAIN, UNSPECIFIED CHRONICITY: ICD-10-CM

## 2018-04-02 PROCEDURE — 99204 OFFICE O/P NEW MOD 45 MIN: CPT | Mod: 25,S$PBB,, | Performed by: ORTHOPAEDIC SURGERY

## 2018-04-02 PROCEDURE — 73562 X-RAY EXAM OF KNEE 3: CPT | Mod: 26,50,, | Performed by: RADIOLOGY

## 2018-04-02 PROCEDURE — 20610 DRAIN/INJ JOINT/BURSA W/O US: CPT | Mod: PBBFAC,PN | Performed by: ORTHOPAEDIC SURGERY

## 2018-04-02 PROCEDURE — 73562 X-RAY EXAM OF KNEE 3: CPT | Mod: TC,50,PO

## 2018-04-02 PROCEDURE — 99999 PR PBB SHADOW E&M-EST. PATIENT-LVL II: CPT | Mod: PBBFAC,,, | Performed by: ORTHOPAEDIC SURGERY

## 2018-04-02 PROCEDURE — 99212 OFFICE O/P EST SF 10 MIN: CPT | Mod: PBBFAC,25,PN | Performed by: ORTHOPAEDIC SURGERY

## 2018-04-02 RX ORDER — TRIAMCINOLONE ACETONIDE 40 MG/ML
40 INJECTION, SUSPENSION INTRA-ARTICULAR; INTRAMUSCULAR
Status: DISCONTINUED | OUTPATIENT
Start: 2018-04-02 | End: 2018-04-02 | Stop reason: HOSPADM

## 2018-04-02 RX ORDER — MELOXICAM 15 MG/1
15 TABLET ORAL DAILY
Qty: 30 TABLET | Refills: 1
Start: 2018-04-02 | End: 2019-08-27 | Stop reason: SDUPTHER

## 2018-04-02 RX ADMIN — TRIAMCINOLONE ACETONIDE 40 MG: 40 INJECTION, SUSPENSION INTRA-ARTICULAR; INTRAMUSCULAR at 03:04

## 2018-04-02 NOTE — LETTER
April 2, 2018      Pooja Vazquez MD  [address] [facility] [address] [place] [address]           [facility]  [address] [facility] [address] [place] [address]  Phone: [phone]          Patient: Pamella Yuan   MR Number: [id number]   YOB: 1950   Date of Visit: 4/2/2018       Dear Dr. Pooja Vazquez:    Thank you for referring Pamella Yuan to me for evaluation. Attached you will find relevant portions of my assessment and plan of care.    If you have questions, please do not hesitate to call me. I look forward to following Pamella Yuan along with you.    Sincerely,    Jaiden Adrian MD    Enclosure  CC:  No Recipients    If you would like to receive this communication electronically, please contact [email or web address] or [phone] to request more information on [employer] Link access.    For providers and/or their staff who would like to refer a patient to [facility], please contact us through our one-stop-shop provider referral line, [facility], at [phone].    If you feel you have received this communication in error or would no longer like to receive these types of communications, please e-mail [email or web address]

## 2018-04-02 NOTE — PROCEDURES
Large Joint Aspiration/Injection  Date/Time: 4/2/2018 3:01 PM  Performed by: ZHANG PIERRE  Authorized by: ZHANG PIERRE.     Consent Done?:  Yes (Verbal)  Indications:  Pain  Timeout: Prior to procedure the correct patient, procedure, and site was verified      Location:  Knee  Site:  R knee  Prep: Patient was prepped and draped in usual sterile fashion    Ultrasonic Guidance for needle placement: No  Needle size:  21 G  Approach:  Anterolateral  Medications:  40 mg triamcinolone acetonide 40 mg/mL  Patient tolerance:  Patient tolerated the procedure well with no immediate complications

## 2018-04-02 NOTE — PROGRESS NOTES
HISTORY OF PRESENT ILLNESS:  67 years old, complaining of pain in her right   knee.  She has had now for about 2 years' time.  It swells on her.  Mobic seems   to help.  She had injection in the past, responded well to most recent   injection; however, January did not seem to help.    PHYSICAL EXAMINATION:  Today shows tenderness at the joint line.  No signs of   infection.  No instability.  Skin is intact.  Compartments are soft.    X-rays show medial joint space narrowing.    ASSESSMENT:  Right knee arthrosis.    PLAN:  Kenalog injection.  Encouraged strengthening exercises.  Follow up as   needed.      PBB/HN  dd: 04/02/2018 14:58:44 (CDT)  td: 04/03/2018 06:33:21 (CDT)  Doc ID   #[id number]  Job ID #[id number]    CC:     Further History  Aching pain  Worse with activity  Relieved with rest  No other associated symptoms  No other radiation    Further Exam  Alert and oriented  Pleasant  Contralateral limb has appropriate range of motion for age and condition  Contralateral limb has appropriate strength for age and condition  Contralateral limb has appropriate stability  for age and condition  No adenopathy  Pulses are appropriate for current condition  Skin is intact        Chief Complaint    Chief Complaint   Patient presents with    Right Knee - Pain       HPI  Pamella Yuan is a 67 y.o.  female who presents with       Past Medical History  Past Medical History:   Diagnosis Date    Arthritis     Celiac disease     with liver damage    Dementia     Hip deformity     Macular degeneration        Past Surgical History  Past Surgical History:   Procedure Laterality Date    CERVICAL FUSION  2000    CHOLECYSTECTOMY  2003    HIP FRACTURE SURGERY Left 1958    HIP SURGERY Right 02/18/2016    THR    HYSTERECTOMY  2000    MIGUELINA with BSO    LASIK Bilateral     corrected for monovision    OOPHORECTOMY  2000       Medications  Current Outpatient Prescriptions   Medication Sig    b complex vitamins tablet Take 1 tablet  by mouth once daily.    estradiol (ESTRACE) 0.01 % (0.1 mg/gram) vaginal cream Place 1 g vaginally twice a week.    krill-om3-dha-epa-om6-lip-astx (KRILL OIL, OMEGA 3 & 6,) 1,500-165-67.5 mg Cap Take 1 capsule by mouth once daily.    pravastatin (PRAVACHOL) 20 MG tablet TAKE 1 TABLET BY MOUTH EVERY DAY    VIT C/E/ZN/COPPR/LUTEIN/ZEAXAN (PRESERVISION AREDS 2 ORAL) Take 2 tablets by mouth every morning.     No current facility-administered medications for this visit.        Allergies  Review of patient's allergies indicates:   Allergen Reactions    Decongestant (ppa)      Heart racing    Gluten protein      Pt has celiac disease, will trigger attack       Family History  Family History   Problem Relation Age of Onset    Hypertension Mother     Heart disease Mother     Heart disease Father     Macular degeneration Father     Nephrolithiasis Father     Breast cancer Maternal Aunt        Social History  Social History     Social History    Marital status:      Spouse name: N/A    Number of children: 1    Years of education: N/A     Occupational History    retired        of [employer]     Social History Main Topics    Smoking status: Never Smoker    Smokeless tobacco: Never Used    Alcohol use No    Drug use: No    Sexual activity: Yes     Partners: Male     Other Topics Concern    Not on file     Social History Narrative    No narrative on file               Review of Systems     Constitutional: Negative    HENT: Negative  Eyes: Negative  Respiratory: Negative  Cardiovascular: Negative  Musculoskeletal: HPI  Skin: Negative  Neurological: Negative  Hematological: Negative  Endocrine: Negative                 Physical Exam    There were no vitals filed for this visit.  Body mass index is 28.67 kg/m².  Physical Examination:     General appearance -  well appearing, and in no distress  Mental status - awake  Neck - supple  Chest -  symmetric air entry  Heart - normal  rate   Abdomen - soft      Assessment     1. Chronic pain of right knee          Plan  Answers for HPI/ROS submitted by the patient on 3/29/2018   Leg pain  unexpected weight change: No  appetite change : No  sleep disturbance: No  IMMUNOCOMPROMISED: No  nervous/ anxious: No  dysphoric mood: No  rash: No  visual disturbance: No  eye redness: No  eye pain: No  ear pain: No  tinnitus: No  hearing loss: No  sinus pressure : No  nosebleeds: No  enviro allergies: No  food allergies: Yes  cough: No  shortness of breath: No  sweating: No  dysuria: No  frequency: No  difficulty urinating: No  hematuria: No  painful intercourse: No  chest pain: No  palpitations: No  nausea: No  vomiting: No  diarrhea: No  blood in stool: No  constipation: No  headaches: No  dizziness: No  numbness: No  seizures: No  joint swelling: Yes  myalgia: Yes  weakness: No  back pain: Yes  Pain Chronicity: recurrent  History of trauma: No  Onset: more than 1 year ago  Frequency: daily  Progression since onset: gradually worsening  Injury mechanism: falling  injury location: at home  pain- numeric: 7/10  pain location: right knee  pain quality: aching  Radiating Pain: Yes  Aggravating factors: lying down  fever: No  inability to bear weight: Yes  itching: No  joint locking: No  limited range of motion: Yes  stiffness: Yes  tingling: No  Treatments tried: injection treatment  physical therapy: ineffective  Improvement on treatment: moderate

## 2018-05-01 DIAGNOSIS — E78.5 HYPERLIPIDEMIA, UNSPECIFIED HYPERLIPIDEMIA TYPE: ICD-10-CM

## 2018-05-02 RX ORDER — PRAVASTATIN SODIUM 20 MG/1
TABLET ORAL
Qty: 90 TABLET | Refills: 11 | OUTPATIENT
Start: 2018-05-02

## 2018-06-27 DIAGNOSIS — E78.5 HYPERLIPIDEMIA, UNSPECIFIED HYPERLIPIDEMIA TYPE: ICD-10-CM

## 2018-06-27 RX ORDER — PRAVASTATIN SODIUM 20 MG/1
20 TABLET ORAL DAILY
Qty: 30 TABLET | Refills: 11 | Status: SHIPPED | OUTPATIENT
Start: 2018-06-27 | End: 2021-11-30

## 2019-08-26 ENCOUNTER — HOSPITAL ENCOUNTER (EMERGENCY)
Facility: HOSPITAL | Age: 69
Discharge: HOME | End: 2019-08-26
Attending: EMERGENCY MEDICINE
Payer: MEDICARE

## 2019-08-26 ENCOUNTER — TELEPHONE (OUTPATIENT)
Dept: ORTHOPEDICS | Facility: CLINIC | Age: 69
End: 2019-08-26

## 2019-08-26 VITALS
TEMPERATURE: 98 F | OXYGEN SATURATION: 98 % | HEART RATE: 96 BPM | BODY MASS INDEX: 29.02 KG/M2 | HEIGHT: 64 IN | RESPIRATION RATE: 18 BRPM | WEIGHT: 170 LBS | DIASTOLIC BLOOD PRESSURE: 63 MMHG | SYSTOLIC BLOOD PRESSURE: 142 MMHG

## 2019-08-26 DIAGNOSIS — M25.461 PAIN AND SWELLING OF KNEE, RIGHT: Primary | ICD-10-CM

## 2019-08-26 DIAGNOSIS — M25.561 PAIN AND SWELLING OF KNEE, RIGHT: Primary | ICD-10-CM

## 2019-08-26 PROCEDURE — 99284 EMERGENCY DEPT VISIT MOD MDM: CPT | Mod: ,,, | Performed by: PHYSICIAN ASSISTANT

## 2019-08-26 PROCEDURE — 25000003 PHARM REV CODE 250: Performed by: PHYSICIAN ASSISTANT

## 2019-08-26 PROCEDURE — 25000003 PHARM REV CODE 250

## 2019-08-26 PROCEDURE — 99283 EMERGENCY DEPT VISIT LOW MDM: CPT | Mod: 25

## 2019-08-26 PROCEDURE — 99284 PR EMERGENCY DEPT VISIT,LEVEL IV: ICD-10-PCS | Mod: ,,, | Performed by: PHYSICIAN ASSISTANT

## 2019-08-26 RX ORDER — MELOXICAM 7.5 MG/1
7.5 TABLET ORAL DAILY
Qty: 10 TABLET | Refills: 0 | Status: SHIPPED | OUTPATIENT
Start: 2019-08-26 | End: 2021-11-30

## 2019-08-26 RX ORDER — ACETAMINOPHEN 500 MG
TABLET ORAL
Status: DISCONTINUED
Start: 2019-08-26 | End: 2019-08-26 | Stop reason: HOSPADM

## 2019-08-26 RX ORDER — NAPROXEN 500 MG/1
TABLET ORAL
Status: COMPLETED
Start: 2019-08-26 | End: 2019-08-26

## 2019-08-26 RX ORDER — ACETAMINOPHEN 500 MG
1000 TABLET ORAL
Status: COMPLETED | OUTPATIENT
Start: 2019-08-26 | End: 2019-08-26

## 2019-08-26 RX ORDER — NAPROXEN 500 MG/1
500 TABLET ORAL
Status: COMPLETED | OUTPATIENT
Start: 2019-08-26 | End: 2019-08-26

## 2019-08-26 RX ADMIN — NAPROXEN 500 MG: 500 TABLET ORAL at 11:08

## 2019-08-26 RX ADMIN — ACETAMINOPHEN 1000 MG: 500 TABLET ORAL at 11:08

## 2019-08-26 NOTE — TELEPHONE ENCOUNTER
----- Message from Mikayla Alejandre LPN sent at 8/26/2019  1:27 PM CDT -----  Contact: Self/ [phone]  Allison -  As per our telephone conversation, pt would like an appt tomorrow with Magda Wright. Thank you so much for your assistance.  Mikayla  [phone]  ----- Message -----  From: Bianca Art MA  Sent: 8/26/2019  12:38 PM  To: [facility] Triage        ----- Message -----  From: Libra Vazquez  Sent: 8/26/2019  12:30 PM  To: [facility] Staff    Patient would like  Call back to speak with a nurse to come in today for a appt for her right knee.

## 2019-08-26 NOTE — ED NOTES
"Pt reports that she was walking last night when she felt a "pop" in her right knee.  Pt states that she has had knee pain for a long time, but now increased pain causing decreased ROM and mobility.      LOC: The patient is awake, alert and aware of environment with an appropriate affect, the patient is oriented x 3 and speaking appropriately.  APPEARANCE: Patient resting comfortably and in no acute distress, patient is clean and well groomed, patient's clothing is properly fastened.  SKIN: The skin is warm and dry, color consistent with ethnicity, patient has normal skin turgor and moist mucus membranes, skin intact, no breakdown or bruising noted.  MUSCULOSKELETAL: Patient moving all extremities spontaneously, + obvious swelling and decreased ROM noted to right knee.  RESPIRATORY: Airway is open and patent, respirations are spontaneous, patient has a normal effort and rate, no accessory muscle use noted.  ABDOMEN: Soft and non tender to palpation, no distention noted, normoactive bowel sounds present in all four quadrants.  NEUROLOGIC:  facial expression is symmetrical, patient moving all extremities spontaneously, normal sensation in all extremities when touched with a finger.  Follows all commands appropriately.      "

## 2019-08-26 NOTE — ED PROVIDER NOTES
"Encounter Date: 8/26/2019       History     Chief Complaint   Patient presents with    Knee Injury     r knee gave out last night, painful, ice pack applied     67 y/o WF with history of celiac disease, chronic R knee pain presents to the ED c/o R knee pain. Last evening, she was walking when her R knee "gave out". She did not fall to the ground, she was able to catch herself. She reports 7-8/10 R knee pain that is localized to the posterior knee and is "squeezing". She has applied a salonpas patch to the posterior R knee with some relief. She denies hearing/feeling a "pop". She denies any PSHx of the R knee. She denies f/c, chest pain, SOB, abdominal pain, nausea, numbness, paresthesias.     The history is provided by the patient.     Review of patient's allergies indicates:   Allergen Reactions    Decongestant (ppa)      Heart racing    Gluten protein      Pt has celiac disease, will trigger attack     Past Medical History:   Diagnosis Date    Arthritis     Celiac disease     with liver damage    Dementia     Hip deformity     Macular degeneration      Past Surgical History:   Procedure Laterality Date    CERVICAL FUSION  2000    CHOLECYSTECTOMY  2003    HIP FRACTURE SURGERY Left 1958    HIP SURGERY Right 02/18/2016    THR    HYSTERECTOMY  2000    MIGUELINA with BSO    LASIK Bilateral     corrected for monovision    OOPHORECTOMY  2000    REPLACEMENT-HIP-TOTAL WITH NAVIGATION Left 2/18/2016    Performed by Hubert Swain MD at [facility] OR [address] [place] [address]     Family History   Problem Relation Age of Onset    Hypertension Mother     Heart disease Mother     Heart disease Father     Macular degeneration Father     Nephrolithiasis Father     Breast cancer Maternal Aunt      Social History     Tobacco Use    Smoking status: Never Smoker    Smokeless tobacco: Never Used   Substance Use Topics    Alcohol use: No     Alcohol/week: 0.0 oz    Drug use: No     Review of Systems   Constitutional: Negative for " chills and fever.   HENT: Negative for congestion, rhinorrhea and sore throat.    Eyes: Negative for photophobia and visual disturbance.   Respiratory: Negative for shortness of breath.    Cardiovascular: Negative for chest pain.   Gastrointestinal: Negative for abdominal pain, constipation, diarrhea, nausea and vomiting.   Genitourinary: Negative for dysuria and hematuria.   Musculoskeletal: Positive for arthralgias, gait problem and joint swelling.   Skin: Negative for rash and wound.   Neurological: Negative for light-headedness, numbness and headaches.   Psychiatric/Behavioral: Negative for confusion.       Physical Exam     Initial Vitals [08/26/19 0951]   BP Pulse Resp Temp SpO2   (!) 142/63 96 18 98 °F (36.7 °C) 98 %      MAP       --         Physical Exam    Nursing note and vitals reviewed.  Constitutional: She appears well-developed and well-nourished. She is not diaphoretic. No distress.   HENT:   Head: Normocephalic and atraumatic.   Neck: Normal range of motion. Neck supple.   Cardiovascular: Normal rate, regular rhythm and normal heart sounds. Exam reveals no gallop and no friction rub.    No murmur heard.  Pulmonary/Chest: Breath sounds normal. She has no wheezes. She has no rhonchi. She has no rales.   Abdominal: Soft. Bowel sounds are normal. There is no tenderness. There is no rebound and no guarding.   Musculoskeletal:        Right knee: She exhibits decreased range of motion (secondary to pain) and swelling. She exhibits no deformity, no laceration and no erythema. Tenderness found.   R DP pulse 2+. Normal sensation to bilateral LE. No calf tenderness.    Neurological: She is alert and oriented to person, place, and time. No sensory deficit.   Skin: Skin is warm and dry. No rash noted. No erythema.   Psychiatric: She has a normal mood and affect.         ED Course   Procedures  Labs Reviewed - No data to display       Imaging Results          X-Ray Knee 3 View Right (Final result)  Result time  08/26/19 11:40:13    Final result by Eriberto Hutchins III, MD (08/26/19 11:40:13)                 Narrative:    EXAMINATION:  XR KNEE 3 VIEW RIGHT    CLINICAL HISTORY:  Pain in right knee    FINDINGS:  There is mild-moderate DJD and a varus deformity.  No fracture dislocation bone destruction or OCD seen.      Electronically signed by: Eriberto Hutchins MD  Date:    08/26/2019  Time:    11:40                               Medical Decision Making:   History:   Old Medical Records: I decided to obtain old medical records.  Old Records Summarized: records from clinic visits.       <> Summary of Records: 4/2/18: ortho visit c/o chronic R knee pain, had steroid injection  Clinical Tests:   Radiological Study: Ordered and Reviewed       APC / Resident Notes:   67 y/o WF with history of celiac disease, chronic R knee pain presents to the ED c/o R knee pain. VSS. Swelling noted to the R knee. No erythema or warmth. Decreased ROM secondary to pain. R pedal pulse 2+. DDx includes but is not limited to fracture, dislocation, sprain, ligamentous injury. Will order xray R knee.     Xray R knee independently reviewed with no acute fracture.    I do not feel that she needs any further labs or imaging at this time. Stable for discharge.     R knee placed in knee immobilizer.  She was provided with cruthces.  She was discharged with a prescription for mobic.  She will follow up with orthopedics.  All of the patient's questions were answered.  I reviewed the patient's chart and imaging and discussed the case with my supervising physician.                    Clinical Impression:       ICD-10-CM ICD-9-CM   1. Pain and swelling of knee, right M25.561 719.46    M25.461 719.06         Disposition:   Disposition: Discharged  Condition: Stable                        Courtney Huerta PA-C  08/26/19 2089

## 2019-08-26 NOTE — PROGRESS NOTES
DATE: 8/27/2019  PATIENT: Pamella Yuan  REFERRING MD:   CHIEF COMPLAINT:   Chief Complaint   Patient presents with    Right Knee - Pain       HISTORY:  Pamella Yuan is a 68 y.o. female  who presents for initial evaluation of her Right knee pain.  She is diagnosed with osteoarthritis.  She has had cortisone injections in the past.  She reports she was walking and her knee buckled, she caught herself on the wall to keep from falling.  She went to the ER and was placed in a knee immobilizer and crutches. Her pain rating today is 2/10 which she reports is better since she has been resting, icing and elevating. Patient's spouse is in attendance today and participating in the history portion of the exam.          PAST MEDICAL/SURGICAL HISTORY:  Past Medical History:   Diagnosis Date    Arthritis     Celiac disease     with liver damage    Dementia     Hip deformity     Macular degeneration      Past Surgical History:   Procedure Laterality Date    CERVICAL FUSION  2000    CHOLECYSTECTOMY  2003    HIP FRACTURE SURGERY Left 1958    HIP SURGERY Right 02/18/2016    THR    HYSTERECTOMY  2000    MIGUELINA with BSO    LASIK Bilateral     corrected for monovision    OOPHORECTOMY  2000    REPLACEMENT-HIP-TOTAL WITH NAVIGATION Left 2/18/2016    Performed by Hubert Swain MD at [facility] OR [address] [place] [address]       Current Medications:   Current Outpatient Medications:     b complex vitamins tablet, Take 1 tablet by mouth once daily., Disp: , Rfl:     estradiol (ESTRACE) 0.01 % (0.1 mg/gram) vaginal cream, Place 1 g vaginally twice a week., Disp: 30 g, Rfl: 2    krill-om3-dha-epa-om6-lip-astx (KRILL OIL, OMEGA 3 & 6,) 1,500-165-67.5 mg Cap, Take 1 capsule by mouth once daily., Disp: 30 capsule, Rfl: 0    meloxicam (MOBIC) 15 MG tablet, Take 1 tablet (15 mg total) by mouth once daily., Disp: 30 tablet, Rfl: 1    meloxicam (MOBIC) 7.5 MG tablet, Take 1 tablet (7.5 mg total) by mouth once daily., Disp: 10 tablet, Rfl: 0     pravastatin (PRAVACHOL) 20 MG tablet, Take 1 tablet (20 mg total) by mouth once daily., Disp: 30 tablet, Rfl: 11    VIT C/E/ZN/COPPR/LUTEIN/ZEAXAN (PRESERVISION AREDS 2 ORAL), Take 2 tablets by mouth every morning., Disp: , Rfl:   No current facility-administered medications for this visit.     Family History: family history was reviewed and is noncontributory  Social History:   Social History     Socioeconomic History    Marital status:      Spouse name: Not on file    Number of children: 1    Years of education: Not on file    Highest education level: Not on file   Occupational History    Occupation: retired      Comment:  of [employer] [place]   [email or web address] Needs    Financial resource strain: Not on file    Food insecurity:     Worry: Not on file     Inability: Not on file    Transportation needs:     Medical: Not on file     Non-medical: Not on file   Tobacco Use    Smoking status: Never Smoker    Smokeless tobacco: Never Used   Substance and Sexual Activity    Alcohol use: No     Alcohol/week: 0.0 oz    Drug use: No    Sexual activity: Yes     Partners: Male   Lifestyle    Physical activity:     Days per week: Not on file     Minutes per session: Not on file    Stress: Not on file   Relationships    Social connections:     Talks on phone: Not on file     Gets together: Not on file     Attends Congregation service: Not on file     Active member of club or organization: Not on file     Attends meetings of clubs or organizations: Not on file     Relationship status: Not on file   Other Topics Concern    Not on file   Social History Narrative    Not on file       ROS:  Constitution: Negative for chills, fever, and sweats. Negative for unexplained weight loss.  Eyes: no redness, no discharge  Ears: no ear pain or tinnitus  Cardiovascular: Negative for chest pain, irregular heartbeat, leg swelling and palpitations.   Respiratory: Negative for cough and shortness of breath.  "  Gastrointestinal: Negative for abdominal pain, nausea and vomiting.   Genitourinary: Negative for bladder incontinence and dysuria.   Neurological: Negative for numbness.   Psychiatric/Behavioral: Negative for behavior changes.   Endocrine: Negative for palpitations.   Hematologic/Lymphatic: Negative for bleeding disorders.  Skin: Negative for pruritis or rash.     PHYSICAL EXAM:  Right Knee Exam     Tenderness   The patient is experiencing tenderness in the lateral joint line and medial joint line (posterior knee).    Range of Motion   Extension: 0   Flexion: 100     Tests   Varus: negative Valgus: negative    Other   Erythema: absent  Scars: absent  Sensation: normal  Pulse: present  Swelling: mild  Effusion: effusion present           Constitutional:  Pamella Yuan is a well developed, well nourished female in no acute distress.   Vitals:    08/27/19 0920   BP: 132/75   Pulse: 82   Weight: 77.1 kg (169 lb 15.6 oz)   Height: 5' 4" (1.626 m)   PainSc:   2   PainLoc: Knee     Psychiatric: pleasant,normal mood and affect, behavior is normal    Neurological:  Sensation intact to light touch, normal reflexes. Coordination normal.   Skin: warm, dry, intact  Cardiovascular: capillary refill less than 3 seconds, pulses 2+      IMAGING:   X-ray obtained and personally reviewed with patient. Radiologist report as follows:  X-Ray Knee 3 View Right  EXAMINATION:  XR KNEE 3 VIEW RIGHT    CLINICAL HISTORY:  Pain in right knee    FINDINGS:  There is mild-moderate DJD and a varus deformity.  No fracture dislocation bone destruction or OCD seen.    Electronically signed by: Eriberto Hutchins MD  Date:    08/26/2019  Time:    11:40         ASSESSMENT:   1. Primary osteoarthritis of right knee  Large Joint Aspiration/Injection: R knee   2. Chronic pain of right knee  meloxicam (MOBIC) 15 MG tablet         PLAN:  The nature of the diagnosis, using models and diagrams when appropriate, was explained to the patient and her  in " detail. Treatment option discussed included non-operative measures of custom orthotic, rest,  modification of activities, application of ice, elevation of extremity, compression, over the counter pain/antiinflammatory relief, physica/occupational therapy, cortisone injection and viscosupplementation.   All questions answered and the patient wishes to proceed today with a cortisone injection and bioskin.  Right knee cortisone injection performed today (see procedure documentation).  I have instructed to monitor injection site for signs and symptoms of inflammation/infection.  I have instructed to elevate and apply ice to knees this evening.  Nancy Tenorio and I performed a custom sling orthotic /brace adjustment, fitting and training with the patient. The patient demonstrated understanding and proper care. This was performed for 15 minutes.  I have given her a handout on Euflexxa injections and instructed her to call to schedule appointments for injections when she returns from her vacation.  I have refilled her meloxicam.  Follow up if no improvement or worsening of symptoms.       Answers for HPI/ROS submitted by the patient on 8/26/2019   Leg pain  activity change: Yes  unexpected weight change: No  neck pain: No  hearing loss: No  rhinorrhea: No  trouble swallowing: No  eye discharge: No  visual disturbance: No  chest tightness: No  wheezing: No  chest pain: No  palpitations: No  blood in stool: No  constipation: No  vomiting: No  diarrhea: No  polydipsia: No  polyuria: No  difficulty urinating: No  hematuria: No  menstrual problem: No  dysuria: No  joint swelling: Yes  arthralgias: Yes  headaches: No  confusion: No  dysphoric mood: No  appetite change : No  sleep disturbance: No  IMMUNOCOMPROMISED: No  nervous/ anxious: No  rash: No  eye redness: No  eye pain: No  ear pain: No  tinnitus: No  sinus pressure : No  nosebleeds: No  enviro allergies: No  food allergies: Yes  cough: No  shortness of breath:  No  sweating: Yes  frequency: No  painful intercourse: No  nausea: No  dizziness: No  numbness: No  seizures: No  myalgia: Yes  back pain: Yes  Pain Chronicity: new  History of trauma: No  Onset: yesterday  Frequency: constantly  Progression since onset: unchanged  injury location: at home  pain- numeric: 6/10  pain location: right knee  pain quality: tightness  Radiating Pain: Yes  If your pain is radiating, to what part of the body?: right foot  Aggravating factors: bearing weight  fever: No  inability to bear weight: Yes  itching: No  joint locking: No  limited range of motion: Yes  stiffness: Yes  tingling: No  Treatments tried: brace/corset  physical therapy: not tried  Improvement on treatment: no relief    Conflicting answers have been found for some questions. Please document the patient's answers manually.

## 2019-08-27 ENCOUNTER — OFFICE VISIT (OUTPATIENT)
Dept: ORTHOPEDICS | Facility: CLINIC | Age: 69
End: 2019-08-27
Payer: MEDICARE

## 2019-08-27 VITALS
HEIGHT: 64 IN | WEIGHT: 170 LBS | SYSTOLIC BLOOD PRESSURE: 132 MMHG | HEART RATE: 82 BPM | DIASTOLIC BLOOD PRESSURE: 75 MMHG | BODY MASS INDEX: 29.02 KG/M2

## 2019-08-27 DIAGNOSIS — G89.29 CHRONIC PAIN OF RIGHT KNEE: ICD-10-CM

## 2019-08-27 DIAGNOSIS — M17.11 PRIMARY OSTEOARTHRITIS OF RIGHT KNEE: Primary | ICD-10-CM

## 2019-08-27 DIAGNOSIS — M25.561 CHRONIC PAIN OF RIGHT KNEE: ICD-10-CM

## 2019-08-27 PROCEDURE — 99213 OFFICE O/P EST LOW 20 MIN: CPT | Mod: S$PBB,25,, | Performed by: NURSE PRACTITIONER

## 2019-08-27 PROCEDURE — 99999 PR PBB SHADOW E&M-EST. PATIENT-LVL III: ICD-10-PCS | Mod: PBBFAC,,, | Performed by: NURSE PRACTITIONER

## 2019-08-27 PROCEDURE — 97760 PR ORTHOTIC MGMT&TRAINJ INITIAL ENC EA 15 MINS: ICD-10-PCS | Mod: GP,S$PBB,, | Performed by: NURSE PRACTITIONER

## 2019-08-27 PROCEDURE — 20610 DRAIN/INJ JOINT/BURSA W/O US: CPT | Mod: S$PBB,RT,, | Performed by: NURSE PRACTITIONER

## 2019-08-27 PROCEDURE — 20610 DRAIN/INJ JOINT/BURSA W/O US: CPT | Mod: PBBFAC,PN | Performed by: NURSE PRACTITIONER

## 2019-08-27 PROCEDURE — 99213 PR OFFICE/OUTPT VISIT, EST, LEVL III, 20-29 MIN: ICD-10-PCS | Mod: S$PBB,25,, | Performed by: NURSE PRACTITIONER

## 2019-08-27 PROCEDURE — 99213 OFFICE O/P EST LOW 20 MIN: CPT | Mod: PBBFAC,PN,25 | Performed by: NURSE PRACTITIONER

## 2019-08-27 PROCEDURE — 97760 ORTHOTIC MGMT&TRAING 1ST ENC: CPT | Mod: GP,S$PBB,, | Performed by: NURSE PRACTITIONER

## 2019-08-27 PROCEDURE — 99999 PR PBB SHADOW E&M-EST. PATIENT-LVL III: CPT | Mod: PBBFAC,,, | Performed by: NURSE PRACTITIONER

## 2019-08-27 PROCEDURE — 20610 LARGE JOINT ASPIRATION/INJECTION: R KNEE: ICD-10-PCS | Mod: S$PBB,RT,, | Performed by: NURSE PRACTITIONER

## 2019-08-27 RX ORDER — LETROZOLE 2.5 MG/1
TABLET, FILM COATED ORAL
COMMUNITY
Start: 2019-06-12 | End: 2021-11-30

## 2019-08-27 RX ORDER — TRIAMCINOLONE ACETONIDE 40 MG/ML
40 INJECTION, SUSPENSION INTRA-ARTICULAR; INTRAMUSCULAR
Status: DISCONTINUED | OUTPATIENT
Start: 2019-08-27 | End: 2019-08-27 | Stop reason: HOSPADM

## 2019-08-27 RX ORDER — MELOXICAM 15 MG/1
15 TABLET ORAL DAILY
Qty: 30 TABLET | Refills: 1
Start: 2019-08-27 | End: 2019-08-30 | Stop reason: SDUPTHER

## 2019-08-27 RX ORDER — LETROZOLE 2.5 MG/1
TABLET, FILM COATED ORAL
COMMUNITY
Start: 2019-08-23

## 2019-08-27 RX ADMIN — TRIAMCINOLONE ACETONIDE 40 MG: 40 INJECTION, SUSPENSION INTRA-ARTICULAR; INTRAMUSCULAR at 10:08

## 2019-08-27 NOTE — PROCEDURES
Large Joint Aspiration/Injection: R knee  Date/Time: 8/27/2019 10:24 AM  Performed by: PILAR Haney  Authorized by: PILAR Haney     Consent Done?:  Yes (Verbal)  Indications:  Pain  Timeout: Prior to procedure the correct patient, procedure, and site was verified    Anesthesia    Anesthetic: topical anesthetic    Location:  Knee  Site:  R knee  Prep: Patient was prepped and draped in usual sterile fashion    Needle size:  22 G  Ultrasonic Guidance for needle placement: No  Approach:  Anterolateral  Medications:  40 mg triamcinolone acetonide 40 mg/mL  Patient tolerance:  Patient tolerated the procedure well with no immediate complications

## 2019-08-30 ENCOUNTER — TELEPHONE (OUTPATIENT)
Dept: ORTHOPEDICS | Facility: CLINIC | Age: 69
End: 2019-08-30

## 2019-08-30 DIAGNOSIS — G89.29 CHRONIC PAIN OF RIGHT KNEE: ICD-10-CM

## 2019-08-30 DIAGNOSIS — M25.561 CHRONIC PAIN OF RIGHT KNEE: ICD-10-CM

## 2019-08-30 RX ORDER — MELOXICAM 15 MG/1
TABLET ORAL
Qty: 90 TABLET | Refills: 1 | Status: SHIPPED | OUTPATIENT
Start: 2019-08-30 | End: 2021-11-30 | Stop reason: SDUPTHER

## 2019-08-30 RX ORDER — MELOXICAM 15 MG/1
15 TABLET ORAL DAILY
Qty: 30 TABLET | Refills: 1 | Status: SHIPPED | OUTPATIENT
Start: 2019-08-30 | End: 2019-08-30 | Stop reason: SDUPTHER

## 2019-08-30 NOTE — TELEPHONE ENCOUNTER
Called pharmacy to see if they received the prescription refill they stated they did not receive it the provider resent the medication and I called the patient and let her know that the provider resent the medication and someone should be calling her when it is ready. Patient verbalized understanding.  ----- Message from Rod Rico sent at 8/30/2019 11:05 AM CDT -----  Type: Needs Medical Advice    Who Called:  Patient    Pharmacy name and phone #:    [id number] [place] - [address] [address] [id number]  [place]  [place] [address]  Phone: [phone] Fax: [phone]    [facility] Call Back Number: [phone]    Additional Information: Patient states that her prescription for meloxicam (MOBIC) 15 MG tablet is not at the pharmacy.  Please call to advise

## 2021-01-10 ENCOUNTER — IMMUNIZATION (OUTPATIENT)
Dept: FAMILY MEDICINE | Facility: CLINIC | Age: 71
End: 2021-01-10
Payer: MEDICARE

## 2021-01-10 DIAGNOSIS — Z23 NEED FOR VACCINATION: ICD-10-CM

## 2021-01-10 PROCEDURE — 91300 COVID-19, MRNA, LNP-S, PF, 30 MCG/0.3 ML DOSE VACCINE: CPT | Mod: PBBFAC | Performed by: FAMILY MEDICINE

## 2021-01-31 ENCOUNTER — IMMUNIZATION (OUTPATIENT)
Dept: FAMILY MEDICINE | Facility: CLINIC | Age: 71
End: 2021-01-31
Payer: MEDICARE

## 2021-01-31 DIAGNOSIS — Z23 NEED FOR VACCINATION: Primary | ICD-10-CM

## 2021-01-31 PROCEDURE — 0002A COVID-19, MRNA, LNP-S, PF, 30 MCG/0.3 ML DOSE VACCINE: CPT | Mod: PBBFAC | Performed by: INTERNAL MEDICINE

## 2021-01-31 PROCEDURE — 91300 COVID-19, MRNA, LNP-S, PF, 30 MCG/0.3 ML DOSE VACCINE: CPT | Mod: PBBFAC | Performed by: INTERNAL MEDICINE

## 2021-09-27 ENCOUNTER — IMMUNIZATION (OUTPATIENT)
Dept: FAMILY MEDICINE | Facility: CLINIC | Age: 71
End: 2021-09-27
Payer: MEDICARE

## 2021-09-27 DIAGNOSIS — Z23 NEED FOR VACCINATION: Primary | ICD-10-CM

## 2021-09-27 PROCEDURE — 91300 COVID-19, MRNA, LNP-S, PF, 30 MCG/0.3 ML DOSE VACCINE: CPT | Mod: PBBFAC,PO

## 2021-09-27 PROCEDURE — 0003A COVID-19, MRNA, LNP-S, PF, 30 MCG/0.3 ML DOSE VACCINE: CPT | Mod: PBBFAC,PO

## 2021-11-30 ENCOUNTER — LAB VISIT (OUTPATIENT)
Dept: LAB | Facility: HOSPITAL | Age: 71
End: 2021-11-30
Attending: INTERNAL MEDICINE
Payer: MEDICARE

## 2021-11-30 ENCOUNTER — OFFICE VISIT (OUTPATIENT)
Dept: FAMILY MEDICINE | Facility: CLINIC | Age: 71
End: 2021-11-30
Payer: MEDICARE

## 2021-11-30 VITALS
BODY MASS INDEX: 26.08 KG/M2 | HEIGHT: 64 IN | DIASTOLIC BLOOD PRESSURE: 60 MMHG | HEART RATE: 94 BPM | OXYGEN SATURATION: 99 % | SYSTOLIC BLOOD PRESSURE: 130 MMHG | WEIGHT: 152.75 LBS

## 2021-11-30 DIAGNOSIS — K90.0 CELIAC DISEASE: ICD-10-CM

## 2021-11-30 DIAGNOSIS — C50.912 MALIGNANT NEOPLASM OF LEFT FEMALE BREAST, UNSPECIFIED ESTROGEN RECEPTOR STATUS, UNSPECIFIED SITE OF BREAST: ICD-10-CM

## 2021-11-30 DIAGNOSIS — M54.2 NECK PAIN, ACUTE: Primary | ICD-10-CM

## 2021-11-30 DIAGNOSIS — Z12.11 SCREENING FOR COLON CANCER: ICD-10-CM

## 2021-11-30 DIAGNOSIS — Z23 NEED FOR VACCINATION: ICD-10-CM

## 2021-11-30 DIAGNOSIS — Z13.6 SCREENING FOR CARDIOVASCULAR CONDITION: ICD-10-CM

## 2021-11-30 DIAGNOSIS — M15.9 PRIMARY OSTEOARTHRITIS INVOLVING MULTIPLE JOINTS: ICD-10-CM

## 2021-11-30 LAB
ALBUMIN SERPL BCP-MCNC: 3.3 G/DL (ref 3.5–5.2)
ALP SERPL-CCNC: 154 U/L (ref 55–135)
ALT SERPL W/O P-5'-P-CCNC: 23 U/L (ref 10–44)
ANION GAP SERPL CALC-SCNC: 9 MMOL/L (ref 8–16)
AST SERPL-CCNC: 25 U/L (ref 10–40)
BILIRUB SERPL-MCNC: 0.5 MG/DL (ref 0.1–1)
BUN SERPL-MCNC: 12 MG/DL (ref 8–23)
CALCIUM SERPL-MCNC: 9.1 MG/DL (ref 8.7–10.5)
CHLORIDE SERPL-SCNC: 107 MMOL/L (ref 95–110)
CHOLEST SERPL-MCNC: 250 MG/DL (ref 120–199)
CHOLEST/HDLC SERPL: 3.8 {RATIO} (ref 2–5)
CO2 SERPL-SCNC: 26 MMOL/L (ref 23–29)
CREAT SERPL-MCNC: 0.6 MG/DL (ref 0.5–1.4)
ERYTHROCYTE [DISTWIDTH] IN BLOOD BY AUTOMATED COUNT: 14.9 % (ref 11.5–14.5)
EST. GFR  (AFRICAN AMERICAN): >60 ML/MIN/1.73 M^2
EST. GFR  (NON AFRICAN AMERICAN): >60 ML/MIN/1.73 M^2
GLUCOSE SERPL-MCNC: 91 MG/DL (ref 70–110)
HCT VFR BLD AUTO: 40.5 % (ref 37–48.5)
HDLC SERPL-MCNC: 65 MG/DL (ref 40–75)
HDLC SERPL: 26 % (ref 20–50)
HGB BLD-MCNC: 12.8 G/DL (ref 12–16)
LDLC SERPL CALC-MCNC: 170 MG/DL (ref 63–159)
MCH RBC QN AUTO: 28.8 PG (ref 27–31)
MCHC RBC AUTO-ENTMCNC: 31.6 G/DL (ref 32–36)
MCV RBC AUTO: 91 FL (ref 82–98)
NONHDLC SERPL-MCNC: 185 MG/DL
PLATELET # BLD AUTO: 164 K/UL (ref 150–450)
PMV BLD AUTO: 10.8 FL (ref 9.2–12.9)
POTASSIUM SERPL-SCNC: 4.1 MMOL/L (ref 3.5–5.1)
PROT SERPL-MCNC: 6.5 G/DL (ref 6–8.4)
RBC # BLD AUTO: 4.45 M/UL (ref 4–5.4)
SODIUM SERPL-SCNC: 142 MMOL/L (ref 136–145)
TRIGL SERPL-MCNC: 75 MG/DL (ref 30–150)
WBC # BLD AUTO: 5.41 K/UL (ref 3.9–12.7)

## 2021-11-30 PROCEDURE — G0009 ADMIN PNEUMOCOCCAL VACCINE: HCPCS | Mod: PBBFAC,PO

## 2021-11-30 PROCEDURE — 99214 OFFICE O/P EST MOD 30 MIN: CPT | Mod: PBBFAC,PO | Performed by: INTERNAL MEDICINE

## 2021-11-30 PROCEDURE — 99204 PR OFFICE/OUTPT VISIT, NEW, LEVL IV, 45-59 MIN: ICD-10-PCS | Mod: S$PBB,,, | Performed by: INTERNAL MEDICINE

## 2021-11-30 PROCEDURE — 90732 PPSV23 VACC 2 YRS+ SUBQ/IM: CPT | Mod: PBBFAC,PO

## 2021-11-30 PROCEDURE — 99999 PR PBB SHADOW E&M-EST. PATIENT-LVL IV: CPT | Mod: PBBFAC,,, | Performed by: INTERNAL MEDICINE

## 2021-11-30 PROCEDURE — 85027 COMPLETE CBC AUTOMATED: CPT | Performed by: INTERNAL MEDICINE

## 2021-11-30 PROCEDURE — 80053 COMPREHEN METABOLIC PANEL: CPT | Performed by: INTERNAL MEDICINE

## 2021-11-30 PROCEDURE — 36415 COLL VENOUS BLD VENIPUNCTURE: CPT | Mod: PO | Performed by: INTERNAL MEDICINE

## 2021-11-30 PROCEDURE — 99999 PR PBB SHADOW E&M-EST. PATIENT-LVL IV: ICD-10-PCS | Mod: PBBFAC,,, | Performed by: INTERNAL MEDICINE

## 2021-11-30 PROCEDURE — 99204 OFFICE O/P NEW MOD 45 MIN: CPT | Mod: S$PBB,,, | Performed by: INTERNAL MEDICINE

## 2021-11-30 PROCEDURE — 80061 LIPID PANEL: CPT | Performed by: INTERNAL MEDICINE

## 2021-11-30 RX ORDER — MELOXICAM 15 MG/1
15 TABLET ORAL DAILY
Qty: 90 TABLET | Refills: 0 | Status: SHIPPED | OUTPATIENT
Start: 2021-11-30 | End: 2022-02-26 | Stop reason: SDUPTHER

## 2022-02-26 DIAGNOSIS — M15.9 PRIMARY OSTEOARTHRITIS INVOLVING MULTIPLE JOINTS: ICD-10-CM

## 2022-02-26 RX ORDER — MELOXICAM 15 MG/1
TABLET ORAL
Qty: 90 TABLET | Refills: 0 | Status: SHIPPED | OUTPATIENT
Start: 2022-02-26

## 2022-02-26 NOTE — TELEPHONE ENCOUNTER
This Rx Request does not qualify for assessment with the OR.    Please review protocol details and the Care Due Message for extra clinical information    Reasons Rx Request may be deferred:  Visit criteria overdue  Labs/Vitals overdue/ abnormal  Patient has been seen in the ED/Hospital since the last PCP visit  Medication is outside of scope   Provider is a non-participating provider   Change request from pharmacy- see pharmacy comment for details     7.) unclear if pt est care with provider

## 2022-04-18 ENCOUNTER — OFFICE VISIT (OUTPATIENT)
Dept: OTOLARYNGOLOGY | Facility: CLINIC | Age: 72
End: 2022-04-18
Payer: MEDICARE

## 2022-04-18 VITALS — OXYGEN SATURATION: 98 % | HEART RATE: 84 BPM | WEIGHT: 149.94 LBS | BODY MASS INDEX: 25.6 KG/M2 | HEIGHT: 64 IN

## 2022-04-18 DIAGNOSIS — J38.3 STRAIN OF VOCAL CORDS: Primary | ICD-10-CM

## 2022-04-18 DIAGNOSIS — K21.9 LARYNGOPHARYNGEAL REFLUX (LPR): ICD-10-CM

## 2022-04-18 DIAGNOSIS — F44.4 FUNCTIONAL DYSPHONIA: ICD-10-CM

## 2022-04-18 DIAGNOSIS — H93.13 TINNITUS, BILATERAL: ICD-10-CM

## 2022-04-18 DIAGNOSIS — R09.A2 GLOBUS PHARYNGEUS: ICD-10-CM

## 2022-04-18 PROCEDURE — 99213 OFFICE O/P EST LOW 20 MIN: CPT | Mod: PBBFAC,PO | Performed by: OTOLARYNGOLOGY

## 2022-04-18 PROCEDURE — 99999 PR PBB SHADOW E&M-EST. PATIENT-LVL III: ICD-10-PCS | Mod: PBBFAC,,, | Performed by: OTOLARYNGOLOGY

## 2022-04-18 PROCEDURE — 31575 DIAGNOSTIC LARYNGOSCOPY: CPT | Mod: PBBFAC,PO | Performed by: OTOLARYNGOLOGY

## 2022-04-18 PROCEDURE — 99204 PR OFFICE/OUTPT VISIT, NEW, LEVL IV, 45-59 MIN: ICD-10-PCS | Mod: 25,S$PBB,, | Performed by: OTOLARYNGOLOGY

## 2022-04-18 PROCEDURE — 99204 OFFICE O/P NEW MOD 45 MIN: CPT | Mod: 25,S$PBB,, | Performed by: OTOLARYNGOLOGY

## 2022-04-18 PROCEDURE — 31575 LARYNGOSCOPY: ICD-10-PCS | Mod: S$PBB,,, | Performed by: OTOLARYNGOLOGY

## 2022-04-18 PROCEDURE — 99999 PR PBB SHADOW E&M-EST. PATIENT-LVL III: CPT | Mod: PBBFAC,,, | Performed by: OTOLARYNGOLOGY

## 2022-04-18 NOTE — PROGRESS NOTES
[facility]    Subjective:      Patient: Pamella Yuan Patient PCP: Pooja Vazquez MD         :  1950     Sex:  female      MRN:  [id number]          Date of Visit: 2022      Chief Complaint:     Patient ID: Pamella Yuan is a 71 y.o. female lifelong NON-smoker with some history of anxiety /stress subjectively and a history of left breast cancer on Femara but no recent medication changes who was self-referred for evaluation of irritated throat anterior neck tenderness hoarseness and bilateral tinnitus.  The tinnitus is not been associated with any fullness or fluctuating hearing loss and no vertigo.  It is nonpulsatile in nature and really only heard in the quiet of night but not affecting sleep.  Her sleep is affected by her use of Femara and she also has some associated nausea.  Her chief complaint is the absence of any reflux but feeling hoarse in the voice and this aching discomfort in the anterior paralaryngeal area without localization to 1 side or the other.  No specific odynophonia or otalgia.  Specifically denies any dysphagia or hemoptysis.  She has had this discomfort in her larynx and anterior neck intermittently for maybe 2+ months and has noticed some persistent hoarseness for the last month.    Review of Systems   Constitutional: Negative.    HENT: Positive for sore throat, tinnitus and voice change. Negative for congestion, ear pain, hearing loss, mouth sores, postnasal drip, rhinorrhea, sinus pressure and trouble swallowing.    Respiratory: Negative.    Cardiovascular: Negative.    Gastrointestinal: Negative.    Genitourinary: Negative.    Musculoskeletal: Positive for neck pain (anterior neck/perilaryngeal).   Skin: Negative.    Allergic/Immunologic: Positive for food allergies (history of celiac disease).   Neurological: Negative.    Hematological: Negative.    Psychiatric/Behavioral: Negative.         Past Medical History  She has a past medical history of Arthritis, Celiac  disease, Hip deformity, and Macular degeneration.    Family / Surgical / Social History  Her family history includes Breast cancer in her maternal aunt; Heart disease in her father and mother; Hypertension in her mother; Macular degeneration in her father; Nephrolithiasis in her father.    Past Surgical History:   Procedure Laterality Date    CERVICAL FUSION  2000    CHOLECYSTECTOMY  2003    HIP FRACTURE SURGERY Left 1958    HIP SURGERY Right 02/18/2016    THR    HYSTERECTOMY  2000    MIGUELINA with BSO    LASIK Bilateral     corrected for monovision    OOPHORECTOMY  2000       Social History     Tobacco Use    Smoking status: Never Smoker    Smokeless tobacco: Never Used   Substance and Sexual Activity    Alcohol use: No     Alcohol/week: 0.0 standard drinks    Drug use: No    Sexual activity: Yes     Partners: Male       Medications  She has a current medication list which includes the following prescription(s): b complex vitamins, estradiol, krill-om3-dha-epa-om6-lip-astx, letrozole, meloxicam, and vit c/e/zn/coppr/lutein/zeaxan.      Allergies  Review of patient's allergies indicates:   Allergen Reactions    Aller-chlor decongestant     Decongestant (ppa)      Heart racing    Gluten protein      Pt has celiac disease, will trigger attack       All medications, allergies, and past history have been reviewed.    Objective:      Vitals:  Vitals - 1 value per visit 11/30/2021 4/18/2022 4/18/2022   SYSTOLIC 130 - -   DIASTOLIC 60 - -   Pulse 94 - 84   Temp - - -   Resp - - -   SPO2 99 - 98   Weight (lb) 152.78 - 149.91   Weight (kg) 69.3 - 68   Height 64 - 64   BMI (Calculated) 26.2 - 25.7   VISIT REPORT - - -   Pain Score  - 2 -       Body surface area is 1.75 meters squared.    Physical Exam:    GENERAL  APPEARANCE -  alert, appears stated age and cooperative  BARRIER(S) TO COMMUNICATION -  none VOICE - appropriate for age and gender    INTEGUMENTARY  no suspicious head and neck lesions    HEENT  HEAD:  Normocephalic, without obvious abnormality, atraumatic  FACE: INSPECTION - Symmetric, no signs of trauma, no suspicious lesion(s)  PALPATION -  No masses SALIVARY GLANDS - non-tender with no appreciable mass  STRENGTH - facial symmetry  NECK/THYROID: normal atraumatic, no neck masses, normal thyroid, no jvd    EYES  Normal occular alignment and mobility with no visible nystagmus at rest    EARS/NOSE/MOUTH/THROAT  EARS  PINNAE AND EXTERNAL EARS - no suspicious lesion OTOSCOPIC EXAM (surgical microscopy was not used for visualization/instrumentation): EAR EXAM - Normal ear canals, tympanic membranes and mobility, and middle ear spaces bilaterally.  HEARING - grossly intact to voice/finger rub    NOSE AND SINUSES  EXTERNAL NOSE - Grossly normal for age/sex  SEPTUM - RIGHT moderate deviation TURBINATES - swollen, grade 3 out of 4 and left only MUCOSA - within normal limits     MOUTH AND THROAT   ORAL CAVITY, LIPS, TEETH, GUMS & TONGUE - moist, no suspicious lesions  OROPHARYNX /TONSILS/PHARYNGEAL WALLS/HYPOPHARYNX - no erythema or exudates  NASOPHARYNX - limited mirror exam - unable to visualize due to anatomy/gag  LARYNX -  - limited mirror exam - unable to visualize due to anatomy/gag      CHEST AND LUNG   INSPECTION & AUSCULTATION - normal effort, no stridor    CARDIOVASCULAR  AUSCULTATION & PERIPHERAL VASCULAR - regular rate and rhythm.    NEUROLOGIC  MENTAL STATUS - alert, interactive CRANIAL NERVES - normal    LYMPHATIC  HEAD AND NECK - non-palpable; SUPRACLAVICULAR - deferred; AXILLARY - deferred; INGUINAL - deferred; LIVER/SPLEEN - deferred        Procedure(s):  Flexible laryngoscopy performed.  See procedure note.    Labs:  WBC   Date Value Ref Range Status   11/30/2021 5.41 3.90 - 12.70 K/uL Final     Hemoglobin   Date Value Ref Range Status   11/30/2021 12.8 12.0 - 16.0 g/dL Final     Platelets   Date Value Ref Range Status   11/30/2021 164 150 - 450 K/uL Final     Creatinine   Date Value Ref Range Status    11/30/2021 0.6 0.5 - 1.4 mg/dL Final     TSH   Date Value Ref Range Status   02/02/2018 2.226 0.400 - 4.000 uIU/mL Final     Glucose   Date Value Ref Range Status   11/30/2021 91 70 - 110 mg/dL Final         Assessment:        ICD-10-CM ICD-9-CM   1. Strain of vocal cords  J38.3 478.5   2. Globus pharyngeus  R19.8 306.4   3. Functional dysphonia  F44.4 300.11   4. Tinnitus, bilateral  H93.13 388.30   5. Laryngopharyngeal reflux (LPR)  K21.9 478.79              Plan:      Speech therapy and reflux lifestyle changes as discussed rather than an additional medications for what might be attributable to laryngopharyngeal reflux.  There is certainly an element of stress and vocal strain which is contributing.  For the reflux specifically sodium alginate which can be obtained through [employer] duct common the form of mint chocolate rescue or esophageal Health she be taken nightly or even several times daily to control asymptomatic or silent reflux in addition to avoiding the current late-night drinking of hot cocoa.  Continue with head of bed elevation.    Speech therapy for vocal strain and hoarseness which appears to be functional rather than any static lesion or neurologic dysfunction.    Mindfulness meditation could be of great help in both reducing vocal strain as well as the impact of nighttime tinnitus.  Dealing with stressors with mindfulness or even with cognitive behavioral therapy through a qualified psychotherapist can be of help.  Full audiologic testing in counseling on tinnitus with audiology.    Return for further evaluation treatment if symptoms fail to resolve entirely or with any concerns.

## 2022-04-18 NOTE — PATIENT INSTRUCTIONS
Speech therapy and reflux lifestyle changes as discussed rather than an additional medications for what might be attributable to laryngopharyngeal reflux.  There is certainly an element of stress and vocal strain which is contributing.  For the reflux specifically sodium alginate which can be obtained through [employer] duct common the form of mint chocolate rescue or esophageal Health she be taken nightly or even several times daily to control asymptomatic or silent reflux in addition to avoiding the current late-night drinking of hot cocoa.  Continue with head of bed elevation.    Speech therapy for vocal strain and hoarseness which appears to be functional rather than any static lesion or neurologic dysfunction.    Mindfulness meditation could be of great help in both reducing vocal strain as well as the impact of nighttime tinnitus.  Dealing with stressors with mindfulness or even with cognitive behavioral therapy through a qualified psychotherapist can be of help.  Full audiologic testing in counseling on tinnitus with audiology.    Return for further evaluation treatment if symptoms fail to resolve entirely or with any concerns.

## 2022-04-18 NOTE — PROCEDURES
"Laryngoscopy    Date/Time: 4/18/2022 2:30 PM  Performed by: Kurtis Reeves MD  Authorized by: Kurtis Reeves MD     Time out: Immediately prior to procedure a "time out" was called to verify the correct patient, procedure, equipment, support staff and site/side marked as required.    Consent Done?:  Yes (Verbal)  Anesthesia:     Local anesthetic:  Lidocaine 4% and Jr-Synephrine 1/2%    Decongestion performed?: Yes    Laryngoscopy:     Areas examined:  Nasal cavities, nasopharynx, oropharynx, hypopharynx, larynx and vocal cords    Laryngoscope size: Disposable digital Storz rhino laryngoscope.  Larynx/hypopharynx:      Significant nasal deviated septum to the right side bowing and obstructing the right side preventing visualization some caudal deflection on the left side which is able to be bypassed normal left-sided nasal cavity middle meatus sphenoethmoid recess.  Reasonably normal nasopharynx some slight hyperemia there is 1 little tiny area of either mucus or focal leukoplakic change the left of midline mid nasopharynx posterior wall not of any particular suspicious appearance.  No real cobblestoning.  Symmetric base of tongue and vallecular is clear without pooling.  Piriforms only open partially on nasal blowing but no appreciable lesion asymmetry or pooling.  Vocal cords are symmetric and exhibit mild degree of inflammation without any posterior/vocal process granuloma or ulceration.  There is a moderate degree of postcricoid edema and Brown bar formation with a mild degree of arytenoid FLAIR and significant pseudo sulcus vocalis.  Mild strain no plica ventricular is noted.  No specific laryngeal lesions.  No particular asymmetry.  Stroboscopy not available.      "

## 2022-05-03 DIAGNOSIS — H91.90 HEARING DIFFICULTY, UNSPECIFIED LATERALITY: Primary | ICD-10-CM

## 2022-05-04 ENCOUNTER — CLINICAL SUPPORT (OUTPATIENT)
Dept: AUDIOLOGY | Facility: CLINIC | Age: 72
End: 2022-05-04
Payer: MEDICARE

## 2022-05-04 DIAGNOSIS — H90.3 ASYMMETRIC SNHL (SENSORINEURAL HEARING LOSS): ICD-10-CM

## 2022-05-04 DIAGNOSIS — H93.13 TINNITUS, BILATERAL: Primary | ICD-10-CM

## 2022-05-04 PROCEDURE — 99999 PR PBB SHADOW E&M-EST. PATIENT-LVL I: ICD-10-PCS | Mod: PBBFAC,,,

## 2022-05-04 PROCEDURE — 92567 TYMPANOMETRY: CPT | Mod: PBBFAC,PO | Performed by: AUDIOLOGIST-HEARING AID FITTER

## 2022-05-04 PROCEDURE — 99211 OFF/OP EST MAY X REQ PHY/QHP: CPT | Mod: PBBFAC,PO

## 2022-05-04 PROCEDURE — 99999 PR PBB SHADOW E&M-EST. PATIENT-LVL I: CPT | Mod: PBBFAC,,,

## 2022-05-04 PROCEDURE — 92557 COMPREHENSIVE HEARING TEST: CPT | Mod: PBBFAC,PO | Performed by: AUDIOLOGIST-HEARING AID FITTER

## 2022-05-04 NOTE — PROGRESS NOTES
Pamella Yuan was seen 05/04/2022 for an audiological evaluation. Pertinent complaints today include hearing loss and tinnitus AU. Pt denies family Hx of HL and Hx of loud noise exposure. Otoscopy revealed no cerumen in both ears. The tympanic membrane was visualized AU prior to proceeding with the hearing testing.     Results reveal a normal through 2K Hz sloping to profound HF sensorineural hearing loss for the right ear, and  Normal through 4K Hz sloping to moderately severe HF sensorineural hearing loss for the left ear.    Speech Reception Thresholds were  5 dBHL for the right ear and 0 dBHL for the left ear.    Word recognition scores were excellent for the right ear and excellent for the left ear.   Tympanograms were Type A for the right ear and Type A for the left ear.    Audiogram results were reviewed in detail with patient and all questions were answered. Results will be reviewed by the referring provider at the completion of this note. Recommend further medical evaluation with an ENT provider for the asymmetry, binaural amplification pending medical clearance, repeat hearing testing in one year due to asymmetry and tinnitus and bilateral hearing protection with either muffs or in-ear protection in loud noises.

## 2022-05-04 NOTE — Clinical Note
Hearing test results reveal a normal through 2K Hz sloping to profound HF sensorineural hearing loss for the right ear, and  Normal through 4K Hz sloping to moderately severe HF sensorineural hearing loss for the left ear. Asymmetry was noted from 3-8K Hz, AD>AS. She did not seem interested in hearing aids.

## 2022-05-09 ENCOUNTER — PATIENT MESSAGE (OUTPATIENT)
Dept: SMOKING CESSATION | Facility: CLINIC | Age: 72
End: 2022-05-09
Payer: MEDICARE

## 2022-11-15 ENCOUNTER — TELEPHONE (OUTPATIENT)
Dept: ORTHOPEDICS | Facility: CLINIC | Age: 72
End: 2022-11-15
Payer: MEDICARE

## 2025-05-01 ENCOUNTER — LAB VISIT (OUTPATIENT)
Dept: LAB | Facility: HOSPITAL | Age: 75
End: 2025-05-01
Attending: EMERGENCY MEDICINE
Payer: MEDICARE

## 2025-05-01 ENCOUNTER — OFFICE VISIT (OUTPATIENT)
Dept: FAMILY MEDICINE | Facility: CLINIC | Age: 75
End: 2025-05-01
Payer: MEDICARE

## 2025-05-01 VITALS
WEIGHT: 158.94 LBS | TEMPERATURE: 98 F | OXYGEN SATURATION: 91 % | DIASTOLIC BLOOD PRESSURE: 76 MMHG | HEART RATE: 71 BPM | SYSTOLIC BLOOD PRESSURE: 134 MMHG | BODY MASS INDEX: 27.13 KG/M2 | HEIGHT: 64 IN

## 2025-05-01 DIAGNOSIS — M54.50 CHRONIC LEFT-SIDED LOW BACK PAIN WITHOUT SCIATICA: ICD-10-CM

## 2025-05-01 DIAGNOSIS — Z12.11 SCREENING FOR COLON CANCER: ICD-10-CM

## 2025-05-01 DIAGNOSIS — G89.29 CHRONIC LEFT-SIDED LOW BACK PAIN WITHOUT SCIATICA: ICD-10-CM

## 2025-05-01 DIAGNOSIS — R73.9 ELEVATED BLOOD SUGAR: ICD-10-CM

## 2025-05-01 DIAGNOSIS — D64.9 ANEMIA, UNSPECIFIED TYPE: ICD-10-CM

## 2025-05-01 DIAGNOSIS — E78.2 MIXED HYPERLIPIDEMIA: ICD-10-CM

## 2025-05-01 DIAGNOSIS — M15.0 PRIMARY OSTEOARTHRITIS INVOLVING MULTIPLE JOINTS: ICD-10-CM

## 2025-05-01 DIAGNOSIS — C50.912 MALIGNANT NEOPLASM OF LEFT FEMALE BREAST, UNSPECIFIED ESTROGEN RECEPTOR STATUS, UNSPECIFIED SITE OF BREAST: ICD-10-CM

## 2025-05-01 DIAGNOSIS — Z76.89 ENCOUNTER TO ESTABLISH CARE WITH NEW DOCTOR: Primary | ICD-10-CM

## 2025-05-01 DIAGNOSIS — K90.0 CELIAC DISEASE: ICD-10-CM

## 2025-05-01 DIAGNOSIS — R06.09 DYSPNEA ON EXERTION: ICD-10-CM

## 2025-05-01 PROBLEM — R10.819: Status: RESOLVED | Noted: 2017-03-10 | Resolved: 2025-05-01

## 2025-05-01 PROBLEM — R09.A2 GLOBUS PHARYNGEUS: Status: RESOLVED | Noted: 2022-04-18 | Resolved: 2025-05-01

## 2025-05-01 PROBLEM — R92.8 ABNORMAL MAMMOGRAM: Status: RESOLVED | Noted: 2017-03-10 | Resolved: 2025-05-01

## 2025-05-01 PROBLEM — R53.83 FATIGUE: Status: RESOLVED | Noted: 2017-03-10 | Resolved: 2025-05-01

## 2025-05-01 PROBLEM — M25.50 ARTHRALGIA: Status: RESOLVED | Noted: 2018-01-30 | Resolved: 2025-05-01

## 2025-05-01 PROBLEM — J38.3: Status: RESOLVED | Noted: 2022-04-18 | Resolved: 2025-05-01

## 2025-05-01 PROBLEM — M47.26 OSTEOARTHRITIS OF SPINE WITH RADICULOPATHY, LUMBAR REGION: Status: RESOLVED | Noted: 2018-01-30 | Resolved: 2025-05-01

## 2025-05-01 PROBLEM — F44.4 FUNCTIONAL DYSPHONIA: Status: RESOLVED | Noted: 2022-04-18 | Resolved: 2025-05-01

## 2025-05-01 LAB
ABSOLUTE EOSINOPHIL (OHS): 0.16 K/UL
ABSOLUTE MONOCYTE (OHS): 0.48 K/UL (ref 0.3–1)
ABSOLUTE NEUTROPHIL COUNT (OHS): 3 K/UL (ref 1.8–7.7)
ALBUMIN SERPL BCP-MCNC: 3.5 G/DL (ref 3.5–5.2)
ALP SERPL-CCNC: 149 UNIT/L (ref 40–150)
ALT SERPL W/O P-5'-P-CCNC: 22 UNIT/L (ref 10–44)
ANION GAP (OHS): 9 MMOL/L (ref 8–16)
AST SERPL-CCNC: 21 UNIT/L (ref 11–45)
BASOPHILS # BLD AUTO: 0.08 K/UL
BASOPHILS NFR BLD AUTO: 1.5 %
BILIRUB SERPL-MCNC: 0.5 MG/DL (ref 0.1–1)
BUN SERPL-MCNC: 10 MG/DL (ref 8–23)
CALCIUM SERPL-MCNC: 9.2 MG/DL (ref 8.7–10.5)
CHLORIDE SERPL-SCNC: 107 MMOL/L (ref 95–110)
CHOLEST SERPL-MCNC: 253 MG/DL (ref 120–199)
CHOLEST/HDLC SERPL: 3.9 {RATIO} (ref 2–5)
CO2 SERPL-SCNC: 25 MMOL/L (ref 23–29)
CREAT SERPL-MCNC: 0.7 MG/DL (ref 0.5–1.4)
EAG (OHS): 94 MG/DL (ref 68–131)
ERYTHROCYTE [DISTWIDTH] IN BLOOD BY AUTOMATED COUNT: 14.2 % (ref 11.5–14.5)
GFR SERPLBLD CREATININE-BSD FMLA CKD-EPI: >60 ML/MIN/1.73/M2
GLUCOSE SERPL-MCNC: 93 MG/DL (ref 70–110)
HBA1C MFR BLD: 4.9 % (ref 4–5.6)
HCT VFR BLD AUTO: 41.6 % (ref 37–48.5)
HDLC SERPL-MCNC: 65 MG/DL (ref 40–75)
HDLC SERPL: 25.7 % (ref 20–50)
HGB BLD-MCNC: 13 GM/DL (ref 12–16)
IMM GRANULOCYTES # BLD AUTO: 0.01 K/UL (ref 0–0.04)
IMM GRANULOCYTES NFR BLD AUTO: 0.2 % (ref 0–0.5)
LDLC SERPL CALC-MCNC: 170.8 MG/DL (ref 63–159)
LYMPHOCYTES # BLD AUTO: 1.64 K/UL (ref 1–4.8)
MCH RBC QN AUTO: 28.1 PG (ref 27–31)
MCHC RBC AUTO-ENTMCNC: 31.3 G/DL (ref 32–36)
MCV RBC AUTO: 90 FL (ref 82–98)
NONHDLC SERPL-MCNC: 188 MG/DL
NUCLEATED RBC (/100WBC) (OHS): 0 /100 WBC
OHS QRS DURATION: 130 MS
OHS QTC CALCULATION: 495 MS
PLATELET # BLD AUTO: 185 K/UL (ref 150–450)
PMV BLD AUTO: 10.8 FL (ref 9.2–12.9)
POTASSIUM SERPL-SCNC: 4.2 MMOL/L (ref 3.5–5.1)
PROT SERPL-MCNC: 7.3 GM/DL (ref 6–8.4)
RBC # BLD AUTO: 4.63 M/UL (ref 4–5.4)
RELATIVE EOSINOPHIL (OHS): 3 %
RELATIVE LYMPHOCYTE (OHS): 30.5 % (ref 18–48)
RELATIVE MONOCYTE (OHS): 8.9 % (ref 4–15)
RELATIVE NEUTROPHIL (OHS): 55.9 % (ref 38–73)
SODIUM SERPL-SCNC: 141 MMOL/L (ref 136–145)
TRIGL SERPL-MCNC: 86 MG/DL (ref 30–150)
WBC # BLD AUTO: 5.37 K/UL (ref 3.9–12.7)

## 2025-05-01 PROCEDURE — 93005 ELECTROCARDIOGRAM TRACING: CPT | Mod: PBBFAC,PO | Performed by: INTERNAL MEDICINE

## 2025-05-01 PROCEDURE — 80061 LIPID PANEL: CPT

## 2025-05-01 PROCEDURE — 84460 ALANINE AMINO (ALT) (SGPT): CPT | Mod: PO

## 2025-05-01 PROCEDURE — 36415 COLL VENOUS BLD VENIPUNCTURE: CPT | Mod: PO

## 2025-05-01 PROCEDURE — 85025 COMPLETE CBC W/AUTO DIFF WBC: CPT

## 2025-05-01 PROCEDURE — 83036 HEMOGLOBIN GLYCOSYLATED A1C: CPT

## 2025-05-01 PROCEDURE — 99213 OFFICE O/P EST LOW 20 MIN: CPT | Mod: PBBFAC,PO | Performed by: EMERGENCY MEDICINE

## 2025-05-01 PROCEDURE — 99999 PR PBB SHADOW E&M-EST. PATIENT-LVL III: CPT | Mod: PBBFAC,,, | Performed by: EMERGENCY MEDICINE

## 2025-05-01 PROCEDURE — 93010 ELECTROCARDIOGRAM REPORT: CPT | Mod: S$PBB,,, | Performed by: INTERNAL MEDICINE

## 2025-05-01 RX ORDER — MELOXICAM 15 MG/1
15 TABLET ORAL DAILY
Qty: 90 TABLET | Refills: 0 | Status: SHIPPED | OUTPATIENT
Start: 2025-05-01

## 2025-05-01 NOTE — PROGRESS NOTES
Name: Pamella Yuan  MRN: [id number]  : 1950    Subjective   HPI  Pamella Yuan is here today to establish care.      Review of Systems   Constitutional: Negative.    HENT: Negative.     Eyes: Negative.    Respiratory: Negative.     Cardiovascular: Negative.    Gastrointestinal: Negative.    Endocrine: Negative.    Genitourinary: Negative.    Musculoskeletal: Negative.    Allergic/Immunologic: Negative.    Neurological: Negative.    Hematological: Negative.    Psychiatric/Behavioral: Negative.     All other systems reviewed and are negative.       Problem List[1]    Medications Ordered Prior to Encounter[2]    Past Medical History:   Diagnosis Date    Abdominal tenderness in left flank 03/10/2017    Abnormal mammogram 03/10/2017    Arthralgia 2018    Arthritis     Asymptomatic menopausal state  2015    Celiac disease     with liver damage    Depression 2015    Fatigue 03/10/2017    Functional dysphonia 2022    Globus pharyngeus 2022    Hip deformity     Intestinal malabsorption 2015    Macular degeneration     Osteoarthritis of left hip 2015    Osteoarthritis of spine with radiculopathy, lumbar region 2018    Post-traumatic osteoarthritis of left hip 2016    Precordial pain 2015    Situational depression 2016    Status post total hip replacement, left 2016    Strain of vocal cords 2022    Vascular insufficiency 2015    Weakness of left hip 2015       Past Surgical History:   Procedure Laterality Date    CERVICAL FUSION      CHOLECYSTECTOMY      HIP FRACTURE SURGERY Left     HIP SURGERY Right 2016    THR    HYSTERECTOMY      MIGUELINA with BSO    LASIK Bilateral     corrected for monovision    OOPHORECTOMY          Family History   Problem Relation Name Age of Onset    Hypertension Mother      Heart disease Mother      Heart disease Father      Macular degeneration Father       Nephrolithiasis Father      Breast cancer Maternal Aunt         Social History[3]    Review of patient's allergies indicates:   Allergen Reactions    Aller-chlor decongestant     Decongestant (ppa)      Heart racing    Gluten protein      Pt has celiac disease, will trigger attack        Health Maintenance Due   Topic Date Due    RSV Vaccine (Age 60+ and Pregnant patients) (1 - Risk 60-74 years 1-dose series) Never done    Shingles Vaccine (1 of 2) 12/04/2013    Mammogram  12/09/2017    DEXA Scan  11/20/2018    Colorectal Cancer Screening  09/15/2020    COVID-19 Vaccine (4 - 2024-25 season) 09/01/2024       Objective   Vitals:    05/01/25 0858   BP: 134/76   Pulse: 71   Temp: 97.9 °F (36.6 °C)        Physical Exam  Vitals and nursing note reviewed.   Constitutional:       General: She is not in acute distress.     Appearance: Normal appearance. She is well-developed.   HENT:      Head: Normocephalic and atraumatic.      Right Ear: External ear normal.      Left Ear: External ear normal.      Mouth/Throat:      Mouth: Mucous membranes are moist.   Eyes:      General: Lids are normal. Gaze aligned appropriately.      Extraocular Movements: Extraocular movements intact.      Conjunctiva/sclera: Conjunctivae normal.      Pupils: Pupils are equal, round, and reactive to light.   Cardiovascular:      Rate and Rhythm: Normal rate and regular rhythm.      Heart sounds: No murmur heard.     No friction rub. No gallop.   Pulmonary:      Effort: Pulmonary effort is normal. No respiratory distress.      Breath sounds: Normal breath sounds and air entry. No wheezing, rhonchi or rales.   Abdominal:      General: Abdomen is flat. There is no distension.   Musculoskeletal:         General: No swelling or deformity.      Cervical back: Full passive range of motion without pain, normal range of motion and neck supple.      Right lower leg: No edema.      Left lower leg: No edema.   Skin:     General: Skin is warm and dry.       Coloration: Skin is not jaundiced.   Neurological:      General: No focal deficit present.      Mental Status: She is alert and oriented to person, place, and time. Mental status is at baseline.      GCS: GCS eye subscore is 4. GCS verbal subscore is 5. GCS motor subscore is 6.   Psychiatric:         Attention and Perception: Attention and perception normal.         Mood and Affect: Mood normal.         Speech: Speech normal.         Behavior: Behavior normal. Behavior is cooperative.         Thought Content: Thought content normal.         Cognition and Memory: Cognition normal.         Judgment: Judgment normal.          Assessment & Plan  Encounter to establish care with new doctor  Patient needs some health maintenance items updating, we discussed them and the patient was agreeable with all orders placed.  We will obtain them and then address any issues.  This was discussed with the patient and they are aware that we will address results via the [facility] and as needed.    Advised that a healthy lifestyle is built on a foundation of balanced nutrition, regular physical activity, sufficient sleep, stress management, and positive social connections. We discussed prioritizing whole, nutrient-dense foods, staying hydrated, engaging in movement daily, and maintaining mental well-being contribute to overall health. Patient advised to avoid harmful habits like smoking, excessive alcohol consumption, and chronic stress.       Malignant neoplasm of left female breast, unspecified estrogen receptor status, unspecified site of breast  Patient had a lumpectomy and radiation back in 2018-19.  She has bi-annual surveillance with a doctor in [place].    Orders:    CBC Auto Differential; Future    Comprehensive Metabolic Panel; Future    Hemoglobin A1C; Future    Mixed hyperlipidemia    Orders:    Lipid Panel; Future    Anemia, unspecified type       Celiac disease  Screening for colon cancer  Patients last colonoscopy  was 12 years ago, patient refuses to have another as she had a difficult time with the prep.    Orders:    Fecal Immunochemical Test (iFOBT); Future    Chronic left-sided low back pain without sciatica  Primary osteoarthritis involving multiple joints  Patient uses Mobic as needed with good benefit.    Orders:    meloxicam (MOBIC) 15 MG tablet; Take 1 tablet (15 mg total) by mouth once daily.    Elevated blood sugar    Orders:    Hemoglobin A1C; Future    Dyspnea on exertion  Patient relates that she has noticed decreased exercise tolerance.        Orders:    IN OFFICE EKG 12-LEAD (to Muse)    Stress Echo Which stress agent will be used? Pharmacological; Color Flow Doppler? No; Future       EKG interpretation: Normal Sinus rhythm at a ventricular rate of 76 , right bundle branch block noted otherwise without any acute ectopy or st change noted.  No previous study for comparison.        Patient declined the following vaccines today after discussion of risk / benefits: COVID   Patient will go to the Pharmacy for their shingles vaccine, we discussed risks and benefits.      Follow up  6 months    Patient is encouraged to contact me at anytime via [facility] or my office for any needs.    Erlin Dhillon MD  05/01/2025     DISCLAIMER: This note was prepared with [employer] voice recognition transcription software. Garbled syntax, mangled pronouns, and other bizarre constructions may be attributed to that software system.  I attest to having reviewed and edited the generated note for accuracy, though some syntax or spelling errors may persist. Please contact the author of this note for any clarification.         [1]   Patient Active Problem List  Diagnosis    History of hip surgery    Celiac disease    Anemia    Hyperlipidemia    Chronic left-sided low back pain without sciatica    Malignant neoplasm of left female breast    Tinnitus, bilateral   [2]   Current Outpatient Medications on File Prior to Visit    Medication Sig Dispense Refill    b complex vitamins tablet Take 1 tablet by mouth once daily.      krill-om3-dha-epa-om6-lip-astx (KRILL OIL, OMEGA 3 & 6,) 1,500-165-67.5 mg Cap Take 1 capsule by mouth once daily. 30 capsule 0    VIT C/E/ZN/COPPR/LUTEIN/ZEAXAN (PRESERVISION AREDS 2 ORAL) Take 2 tablets by mouth every morning.      [DISCONTINUED] meloxicam (MOBIC) 15 MG tablet TAKE 1 TABLET(15 MG) BY MOUTH EVERY DAY 90 tablet 0    [DISCONTINUED] estradiol (ESTRACE) 0.01 % (0.1 mg/gram) vaginal cream Place 1 g vaginally twice a week. 30 g 2    [DISCONTINUED] letrozole (FEMARA) 2.5 mg Tab        No current facility-administered medications on file prior to visit.   [3]   Social History  Socioeconomic History    Marital status:     Number of children: 1   Occupational History    Occupation: retired      Comment:  of [employer]   Tobacco Use    Smoking status: Never    Smokeless tobacco: Never   Substance and Sexual Activity    Alcohol use: No     Alcohol/week: 0.0 standard drinks of alcohol    Drug use: No    Sexual activity: Yes     Partners: Male     Social Drivers of Health     Financial Resource Strain: Low Risk  (4/30/2025)    Overall Financial Resource Strain (CARDIA)     Difficulty of Paying Living Expenses: Not hard at all   Food Insecurity: No Food Insecurity (4/30/2025)    Hunger Vital Sign     Worried About Running Out of Food in the Last Year: Never true     Ran Out of Food in the Last Year: Never true   Transportation Needs: No Transportation Needs (4/30/2025)    PRAPARE - Transportation     Lack of Transportation (Medical): No     Lack of Transportation (Non-Medical): No   Physical Activity: Inactive (4/30/2025)    Exercise Vital Sign     Days of Exercise per Week: 0 days     Minutes of Exercise per Session: 0 min   Stress: No Stress Concern Present (4/30/2025)    [employer] [place] [employer] - Occupational Stress Questionnaire     Feeling of Stress : Only  a little   Housing Stability: Low Risk  (4/30/2025)    Housing Stability Vital Sign     Unable to Pay for Housing in the Last Year: No     Number of Times Moved in the Last Year: 0     Homeless in the Last Year: No

## 2025-05-01 NOTE — ASSESSMENT & PLAN NOTE
Patient uses Mobic as needed with good benefit.    Orders:    meloxicam (MOBIC) 15 MG tablet; Take 1 tablet (15 mg total) by mouth once daily.

## 2025-05-01 NOTE — ASSESSMENT & PLAN NOTE
Patients last colonoscopy was 12 years ago, patient refuses to have another as she had a difficult time with the prep.    Orders:    Fecal Immunochemical Test (iFOBT); Future

## 2025-05-03 ENCOUNTER — RESULTS FOLLOW-UP (OUTPATIENT)
Dept: FAMILY MEDICINE | Facility: CLINIC | Age: 75
End: 2025-05-03
Payer: MEDICARE

## 2025-05-03 DIAGNOSIS — E78.2 MIXED HYPERLIPIDEMIA: Primary | ICD-10-CM

## 2025-05-04 RX ORDER — ROSUVASTATIN CALCIUM 20 MG/1
20 TABLET, COATED ORAL DAILY
Qty: 90 TABLET | Refills: 1 | Status: SHIPPED | OUTPATIENT
Start: 2025-05-04 | End: 2025-10-31

## 2025-05-07 DIAGNOSIS — Z78.0 MENOPAUSE: ICD-10-CM

## 2025-05-30 ENCOUNTER — HOSPITAL ENCOUNTER (OUTPATIENT)
Dept: CARDIOLOGY | Facility: HOSPITAL | Age: 75
Discharge: HOME | End: 2025-05-30
Attending: EMERGENCY MEDICINE
Payer: MEDICARE

## 2025-05-30 VITALS — WEIGHT: 158 LBS | HEIGHT: 64 IN | BODY MASS INDEX: 26.98 KG/M2

## 2025-05-30 DIAGNOSIS — R06.09 DYSPNEA ON EXERTION: ICD-10-CM

## 2025-05-30 LAB
AORTIC SIZE INDEX (SOV): 1.6 CM/M2
BSA FOR ECHO PROCEDURE: 1.8 M2
CV ECHO LV RWT: 0.47 CM
CV STRESS BASE HR: 76 BPM
DIASTOLIC BLOOD PRESSURE: 83 MMHG
DOP CALC LVOT AREA: 3.1 CM2
DOP CALC LVOT DIAMETER: 2 CM
DOP CALC LVOT PEAK VEL: 1.3 M/S
DOP CALC LVOT STROKE VOLUME: 98 CM3
DOP CALCLVOT PEAK VEL VTI: 31.2 CM
E WAVE DECELERATION TIME: 226 MSEC
E/A RATIO: 0.72
E/E' RATIO: 15 M/S
ECHO LV POSTERIOR WALL: 0.8 CM (ref 0.6–1.1)
EJECTION FRACTION: 65 %
FRACTIONAL SHORTENING: 38.2 % (ref 28–44)
INTERVENTRICULAR SEPTUM: 0.8 CM (ref 0.6–1.1)
LEFT ATRIUM AREA SYSTOLIC (APICAL 2 CHAMBER): 17.49 CM2
LEFT ATRIUM AREA SYSTOLIC (APICAL 4 CHAMBER): 16.25 CM2
LEFT ATRIUM SIZE: 3.1 CM
LEFT ATRIUM VOLUME INDEX MOD: 25 ML/M2
LEFT ATRIUM VOLUME MOD: 45 ML
LEFT INTERNAL DIMENSION IN SYSTOLE: 2.1 CM (ref 2.1–4)
LEFT VENTRICLE DIASTOLIC VOLUME INDEX: 26.55 ML/M2
LEFT VENTRICLE DIASTOLIC VOLUME: 47 ML
LEFT VENTRICLE END SYSTOLIC VOLUME APICAL 2 CHAMBER: 47.38 ML
LEFT VENTRICLE END SYSTOLIC VOLUME APICAL 4 CHAMBER: 42.23 ML
LEFT VENTRICLE MASS INDEX: 40.6 G/M2
LEFT VENTRICLE SYSTOLIC VOLUME INDEX: 8.5 ML/M2
LEFT VENTRICLE SYSTOLIC VOLUME: 15 ML
LEFT VENTRICULAR INTERNAL DIMENSION IN DIASTOLE: 3.4 CM (ref 3.5–6)
LEFT VENTRICULAR MASS: 71.9 G
LV LATERAL E/E' RATIO: 12.1 M/S
LV SEPTAL E/E' RATIO: 18.2 M/S
LVED V (TEICH): 47.27 ML
LVES V (TEICH): 14.98 ML
LVOT MG: 3.38 MMHG
LVOT MV: 0.87 CM/S
MV PEAK A VEL: 1.52 M/S
MV PEAK E VEL: 1.09 M/S
OHS CV CPX 1 MINUTE RECOVERY HEART RATE: 122 BPM
OHS CV CPX 85 PERCENT MAX PREDICTED HEART RATE MALE: 124
OHS CV CPX MAX PREDICTED HEART RATE: 146
OHS CV CPX PATIENT IS FEMALE: 1
OHS CV CPX PATIENT IS MALE: 0
OHS CV CPX PEAK DIASTOLIC BLOOD PRESSURE: 50 MMHG
OHS CV CPX PEAK HEAR RATE: 127 BPM
OHS CV CPX PEAK RATE PRESSURE PRODUCT: ABNORMAL
OHS CV CPX PEAK SYSTOLIC BLOOD PRESSURE: 184 MMHG
OHS CV CPX PERCENT MAX PREDICTED HEART RATE ACHIEVED: 90
OHS CV CPX RATE PRESSURE PRODUCT PRESENTING: 9500
OHS CV INITIAL DOSE: 10 MCG/KG/MIN
OHS CV PEAK DOSE: 20 MCG/KG/MIN
PISA TR MAX VEL: 2.6 M/S
PULM VEIN S/D RATIO: 1.79
PV PEAK D VEL: 0.42 M/S
PV PEAK S VEL: 0.75 M/S
RA PRESSURE ESTIMATED: 3 MMHG
RA VOL SYS: 36.98 ML
RIGHT ATRIAL AREA: 14.3 CM2
RIGHT ATRIUM END SYSTOLIC VOLUME APICAL 4 CHAMBER INDEX BSA: 19.44 ML/M2
RIGHT ATRIUM VOLUME AREA LENGTH APICAL 4 CHAMBER: 34.41 ML
RV TB RVSP: 6 MMHG
SINUS: 2.9 CM
STJ: 2.6 CM
SYSTOLIC BLOOD PRESSURE: 125 MMHG
TDI LATERAL: 0.09 M/S
TDI SEPTAL: 0.06 M/S
TDI: 0.08 M/S
TR MAX PG: 26 MMHG
TV REST PULMONARY ARTERY PRESSURE: 30 MMHG
Z-SCORE OF LEFT VENTRICULAR DIMENSION IN END DIASTOLE: -3.64
Z-SCORE OF LEFT VENTRICULAR DIMENSION IN END SYSTOLE: -2.89

## 2025-05-30 PROCEDURE — 93351 STRESS TTE COMPLETE: CPT | Mod: PO

## 2025-05-30 PROCEDURE — 93351 STRESS TTE COMPLETE: CPT | Mod: 26,,, | Performed by: INTERNAL MEDICINE

## 2025-05-30 NOTE — NURSING NOTE
Dobutamine Stress Echo procedure and medication explained; patient verbalized understanding. 24G IV started to right AC; flushed and secured. Dobutamine infusion started at 10mcg/kg/min per protocol (increased by 10mcg/kg/min every 3 minutes with a max dose of 60mcg/kg/min). Dobutamine 20mcg/kg/min infused; NO Atropine given. Patient's VS returned to baseline during recovery period. IV D/C'd, cath tip intact. Patient tolerated well; no distress noted.

## 2025-08-02 DIAGNOSIS — M15.0 PRIMARY OSTEOARTHRITIS INVOLVING MULTIPLE JOINTS: ICD-10-CM

## 2025-08-02 RX ORDER — MELOXICAM 15 MG/1
15 TABLET ORAL DAILY
Qty: 90 TABLET | Refills: 0 | Status: SHIPPED | OUTPATIENT
Start: 2025-08-02

## 2025-08-02 NOTE — TELEPHONE ENCOUNTER
No care due was identified.  [facility] Embedded Care Due Messages. Reference number: [id number].   8/02/2025 3:39:25 AM CDT